# Patient Record
Sex: MALE | Race: WHITE | NOT HISPANIC OR LATINO
[De-identification: names, ages, dates, MRNs, and addresses within clinical notes are randomized per-mention and may not be internally consistent; named-entity substitution may affect disease eponyms.]

---

## 2018-11-05 ENCOUNTER — RECORD ABSTRACTING (OUTPATIENT)
Age: 58
End: 2018-11-05

## 2018-11-05 DIAGNOSIS — Z83.1 FAMILY HISTORY OF OTHER INFECTIOUS AND PARASITIC DISEASES: ICD-10-CM

## 2018-11-05 DIAGNOSIS — Z80.0 FAMILY HISTORY OF MALIGNANT NEOPLASM OF DIGESTIVE ORGANS: ICD-10-CM

## 2018-11-05 DIAGNOSIS — Z86.39 PERSONAL HISTORY OF OTHER ENDOCRINE, NUTRITIONAL AND METABOLIC DISEASE: ICD-10-CM

## 2018-11-05 DIAGNOSIS — Z80.1 FAMILY HISTORY OF MALIGNANT NEOPLASM OF TRACHEA, BRONCHUS AND LUNG: ICD-10-CM

## 2018-11-05 PROBLEM — Z00.00 ENCOUNTER FOR PREVENTIVE HEALTH EXAMINATION: Status: ACTIVE | Noted: 2018-11-05

## 2018-11-05 RX ORDER — PETROLATUM,WHITE/LANOLIN
OINTMENT (GRAM) TOPICAL
Refills: 0 | Status: ACTIVE | COMMUNITY

## 2018-11-05 RX ORDER — BLOOD-GLUCOSE METER
W/DEVICE EACH MISCELLANEOUS
Refills: 0 | Status: ACTIVE | COMMUNITY

## 2018-11-05 RX ORDER — LANCETS 33 GAUGE
EACH MISCELLANEOUS
Refills: 0 | Status: ACTIVE | COMMUNITY

## 2018-11-05 RX ORDER — BLOOD-GLUCOSE METER
W/DEVICE KIT MISCELLANEOUS
Refills: 0 | Status: ACTIVE | COMMUNITY

## 2018-11-05 RX ORDER — BLOOD SUGAR DIAGNOSTIC
STRIP MISCELLANEOUS
Refills: 0 | Status: ACTIVE | COMMUNITY

## 2018-11-30 ENCOUNTER — APPOINTMENT (OUTPATIENT)
Dept: ENDOCRINOLOGY | Facility: CLINIC | Age: 58
End: 2018-11-30

## 2019-02-07 ENCOUNTER — MEDICATION RENEWAL (OUTPATIENT)
Age: 59
End: 2019-02-07

## 2019-04-24 ENCOUNTER — APPOINTMENT (OUTPATIENT)
Dept: INTERNAL MEDICINE | Facility: CLINIC | Age: 59
End: 2019-04-24

## 2019-04-30 ENCOUNTER — APPOINTMENT (OUTPATIENT)
Dept: ENDOCRINOLOGY | Facility: CLINIC | Age: 59
End: 2019-04-30
Payer: COMMERCIAL

## 2019-04-30 VITALS
SYSTOLIC BLOOD PRESSURE: 120 MMHG | WEIGHT: 180 LBS | HEIGHT: 68 IN | DIASTOLIC BLOOD PRESSURE: 80 MMHG | HEART RATE: 71 BPM | BODY MASS INDEX: 27.28 KG/M2

## 2019-04-30 PROCEDURE — 99213 OFFICE O/P EST LOW 20 MIN: CPT

## 2019-05-03 LAB
ESTIMATED AVERAGE GLUCOSE: 243 MG/DL
HBA1C MFR BLD HPLC: 10.1 %

## 2019-05-03 NOTE — ASSESSMENT
[FreeTextEntry1] : &\par Need to review fingerstick BS, meter.\par Would be helpful to use CGM:  Freestyle Brian

## 2019-05-03 NOTE — PHYSICAL EXAM
[Alert] : alert [No Acute Distress] : no acute distress [Well Nourished] : well nourished [Well Developed] : well developed [Normal Sclera/Conjunctiva] : normal sclera/conjunctiva [EOMI] : extra ocular movement intact [No Proptosis] : no proptosis [Normal Oropharynx] : the oropharynx was normal [Thyroid Not Enlarged] : the thyroid was not enlarged [No Thyroid Nodules] : there were no palpable thyroid nodules [No Respiratory Distress] : no respiratory distress [Clear to Auscultation] : lungs were clear to auscultation bilaterally [No Accessory Muscle Use] : no accessory muscle use [Normal Rate] : heart rate was normal  [Normal S1, S2] : normal S1 and S2 [Regular Rhythm] : with a regular rhythm [Pedal Pulses Normal] : the pedal pulses are present [No Edema] : there was no peripheral edema [Normal Bowel Sounds] : normal bowel sounds [Not Tender] : non-tender [Soft] : abdomen soft [Post Cervical Nodes] : posterior cervical nodes [Not Distended] : not distended [Anterior Cervical Nodes] : anterior cervical nodes [Normal] : normal and non tender [Axillary Nodes] : axillary nodes [Spine Straight] : spine straight [No Spinal Tenderness] : no spinal tenderness [No Stigmata of Cushings Syndrome] : no stigmata of cushings syndrome [Normal Gait] : normal gait [Normal Strength/Tone] : muscle strength and tone were normal [No Rash] : no rash [Normal Reflexes] : deep tendon reflexes were 2+ and symmetric [No Tremors] : no tremors [Oriented x3] : oriented to person, place, and time [Acanthosis Nigricans] : no acanthosis nigricans
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
## 2019-06-03 ENCOUNTER — RX RENEWAL (OUTPATIENT)
Age: 59
End: 2019-06-03

## 2019-06-16 ENCOUNTER — RX RENEWAL (OUTPATIENT)
Age: 59
End: 2019-06-16

## 2019-08-13 ENCOUNTER — APPOINTMENT (OUTPATIENT)
Dept: ENDOCRINOLOGY | Facility: CLINIC | Age: 59
End: 2019-08-13
Payer: COMMERCIAL

## 2019-08-13 VITALS
BODY MASS INDEX: 26.52 KG/M2 | HEIGHT: 68 IN | HEART RATE: 76 BPM | SYSTOLIC BLOOD PRESSURE: 120 MMHG | WEIGHT: 175 LBS | DIASTOLIC BLOOD PRESSURE: 80 MMHG

## 2019-08-13 PROCEDURE — 99214 OFFICE O/P EST MOD 30 MIN: CPT
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
## 2019-08-14 RX ORDER — ENALAPRIL MALEATE 10 MG/1
10 TABLET ORAL TWICE DAILY
Refills: 0 | Status: DISCONTINUED | COMMUNITY
End: 2019-08-14

## 2019-09-19 ENCOUNTER — RX RENEWAL (OUTPATIENT)
Age: 59
End: 2019-09-19

## 2019-09-22 ENCOUNTER — RX RENEWAL (OUTPATIENT)
Age: 59
End: 2019-09-22

## 2019-09-22 RX ORDER — FLASH GLUCOSE SCANNING READER
EACH MISCELLANEOUS
Qty: 1 | Refills: 0 | Status: ACTIVE | COMMUNITY
Start: 2019-09-22 | End: 1900-01-01

## 2020-01-03 ENCOUNTER — RX RENEWAL (OUTPATIENT)
Age: 60
End: 2020-01-03

## 2020-01-06 ENCOUNTER — RX RENEWAL (OUTPATIENT)
Age: 60
End: 2020-01-06

## 2020-01-10 ENCOUNTER — APPOINTMENT (OUTPATIENT)
Dept: ENDOCRINOLOGY | Facility: CLINIC | Age: 60
End: 2020-01-10
Payer: COMMERCIAL

## 2020-01-10 VITALS
WEIGHT: 174 LBS | DIASTOLIC BLOOD PRESSURE: 78 MMHG | HEART RATE: 87 BPM | BODY MASS INDEX: 26.37 KG/M2 | HEIGHT: 68 IN | SYSTOLIC BLOOD PRESSURE: 122 MMHG

## 2020-01-10 PROCEDURE — 99214 OFFICE O/P EST MOD 30 MIN: CPT | Mod: 25

## 2020-01-10 PROCEDURE — 36415 COLL VENOUS BLD VENIPUNCTURE: CPT

## 2020-01-10 NOTE — PHYSICAL EXAM
[Alert] : alert [Well Developed] : well developed [Well Nourished] : well nourished [No Acute Distress] : no acute distress [Normal Voice/Communication] : normal voice communication [Healthy Appearance] : healthy appearance [Normal Oropharynx] : the oropharynx was normal [EOMI] : extra ocular movement intact [No Proptosis] : no proptosis [No Respiratory Distress] : no respiratory distress [No Thyroid Nodules] : there were no palpable thyroid nodules [Thyroid Not Enlarged] : the thyroid was not enlarged [Normal Rate] : heart rate was normal  [No Accessory Muscle Use] : no accessory muscle use [Normal S1, S2] : normal S1 and S2 [No Edema] : there was no peripheral edema [Regular Rhythm] : with a regular rhythm [Pedal Pulses Normal] : the pedal pulses are present [Spine Straight] : spine straight [No Stigmata of Cushings Syndrome] : no stigmata of cushings syndrome [Normal Gait] : normal gait [No Involuntary Movements] : no involuntary movements were seen [Acanthosis Nigricans] : no acanthosis nigricans [Oriented x3] : oriented to person, place, and time [No Tremors] : no tremors [Normal Insight/Judgement] : insight and judgment were intact [Normal Affect] : the affect was normal [Normal Mood] : the mood was normal
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
## 2020-01-11 LAB
ANION GAP SERPL CALC-SCNC: 13 MMOL/L
BUN SERPL-MCNC: 19 MG/DL
CALCIUM SERPL-MCNC: 9.5 MG/DL
CHLORIDE SERPL-SCNC: 100 MMOL/L
CO2 SERPL-SCNC: 25 MMOL/L
CREAT SERPL-MCNC: 1.01 MG/DL
ESTIMATED AVERAGE GLUCOSE: 214 MG/DL
GLUCOSE SERPL-MCNC: 215 MG/DL
HBA1C MFR BLD HPLC: 9.1 %
POTASSIUM SERPL-SCNC: 4.7 MMOL/L
SODIUM SERPL-SCNC: 138 MMOL/L

## 2020-01-11 RX ORDER — ELECTROLYTES/DEXTROSE
31G X 5 MM SOLUTION, ORAL ORAL
Qty: 100 | Refills: 1 | Status: ACTIVE | COMMUNITY
Start: 2020-01-11 | End: 1900-01-01

## 2020-01-13 RX ORDER — INSULIN DETEMIR 100 [IU]/ML
100 INJECTION, SOLUTION SUBCUTANEOUS DAILY
Qty: 6 | Refills: 1 | Status: DISCONTINUED | COMMUNITY
Start: 2020-01-11 | End: 2020-01-13

## 2020-04-01 ENCOUNTER — APPOINTMENT (OUTPATIENT)
Dept: ENDOCRINOLOGY | Facility: CLINIC | Age: 60
End: 2020-04-01
Payer: COMMERCIAL

## 2020-04-01 PROCEDURE — 99443: CPT
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
## 2020-05-22 ENCOUNTER — RX RENEWAL (OUTPATIENT)
Age: 60
End: 2020-05-22

## 2020-09-30 ENCOUNTER — APPOINTMENT (OUTPATIENT)
Dept: INTERNAL MEDICINE | Facility: CLINIC | Age: 60
End: 2020-09-30
Payer: COMMERCIAL

## 2020-09-30 ENCOUNTER — NON-APPOINTMENT (OUTPATIENT)
Age: 60
End: 2020-09-30

## 2020-09-30 VITALS
BODY MASS INDEX: 25.91 KG/M2 | OXYGEN SATURATION: 100 % | TEMPERATURE: 98.4 F | HEIGHT: 68 IN | WEIGHT: 171 LBS | HEART RATE: 92 BPM | DIASTOLIC BLOOD PRESSURE: 80 MMHG | SYSTOLIC BLOOD PRESSURE: 124 MMHG

## 2020-09-30 DIAGNOSIS — Z23 ENCOUNTER FOR IMMUNIZATION: ICD-10-CM

## 2020-09-30 DIAGNOSIS — Z12.11 ENCOUNTER FOR SCREENING FOR MALIGNANT NEOPLASM OF COLON: ICD-10-CM

## 2020-09-30 PROCEDURE — 90686 IIV4 VACC NO PRSV 0.5 ML IM: CPT

## 2020-09-30 PROCEDURE — 36415 COLL VENOUS BLD VENIPUNCTURE: CPT

## 2020-09-30 PROCEDURE — 99396 PREV VISIT EST AGE 40-64: CPT | Mod: 25

## 2020-09-30 PROCEDURE — G0442 ANNUAL ALCOHOL SCREEN 15 MIN: CPT

## 2020-09-30 PROCEDURE — G0444 DEPRESSION SCREEN ANNUAL: CPT

## 2020-09-30 PROCEDURE — 93000 ELECTROCARDIOGRAM COMPLETE: CPT | Mod: 59

## 2020-09-30 PROCEDURE — G0008: CPT

## 2020-10-02 ENCOUNTER — NON-APPOINTMENT (OUTPATIENT)
Age: 60
End: 2020-10-02

## 2020-10-02 PROBLEM — Z12.11 SCREENING FOR COLON CANCER: Status: ACTIVE | Noted: 2020-09-30

## 2020-10-02 PROBLEM — Z23 FLU VACCINE NEED: Status: ACTIVE | Noted: 2020-09-30

## 2020-10-02 LAB
PSA FREE FLD-MCNC: 20 %
PSA FREE SERPL-MCNC: 0.75 NG/ML
PSA SERPL-MCNC: 3.7 NG/ML

## 2020-10-02 NOTE — PHYSICAL EXAM
[No Acute Distress] : no acute distress [Normal Sclera/Conjunctiva] : normal sclera/conjunctiva [PERRL] : pupils equal round and reactive to light [EOMI] : extraocular movements intact [Normal Outer Ear/Nose] : the outer ears and nose were normal in appearance [Normal TMs] : both tympanic membranes were normal [No JVD] : no jugular venous distention [No Respiratory Distress] : no respiratory distress  [Clear to Auscultation] : lungs were clear to auscultation bilaterally [Normal Rate] : normal rate  [Regular Rhythm] : with a regular rhythm [Normal S1, S2] : normal S1 and S2 [No Murmur] : no murmur heard [No Carotid Bruits] : no carotid bruits [Pedal Pulses Present] : the pedal pulses are present [No Edema] : there was no peripheral edema [Soft] : abdomen soft [Non Tender] : non-tender [Normal Bowel Sounds] : normal bowel sounds [Normal Sphincter Tone] : normal sphincter tone [No Mass] : no mass [Normal Supraclavicular Nodes] : no supraclavicular lymphadenopathy [Normal Axillary Nodes] : no axillary lymphadenopathy [Normal Posterior Cervical Nodes] : no posterior cervical lymphadenopathy [Normal Anterior Cervical Nodes] : no anterior cervical lymphadenopathy [No CVA Tenderness] : no CVA  tenderness [No Spinal Tenderness] : no spinal tenderness [No Joint Swelling] : no joint swelling [Grossly Normal Strength/Tone] : grossly normal strength/tone [Normal] : affect was normal and insight and judgment were intact [None] : no ulcers in either foot were found [Stool Occult Blood] : stool negative for occult blood [FreeTextEntry1] : Normal prostate [de-identified] : No maceration between toes [de-identified] : Normal sensation to fine touch\par Normal vibratory sense [TWNoteComboBox3] : +2 [TWNoteComboBox4] : +2

## 2020-10-02 NOTE — REVIEW OF SYSTEMS
[Fever] : no fever [Chills] : no chills [Pain] : no pain [Redness] : no redness [Earache] : no earache [Hearing Loss] : no hearing loss [Sore Throat] : no sore throat [Chest Pain] : no chest pain [Palpitations] : no palpitations [Shortness Of Breath] : no shortness of breath [Abdominal Pain] : no abdominal pain [Constipation] : no constipation [Melena] : no melena [Dysuria] : no dysuria [Incontinence] : no incontinence [Nocturia] : no nocturia [Frequency] : no frequency [Joint Pain] : no joint pain [Itching] : no itching [Skin Rash] : no skin rash [Headache] : no headache [Dizziness] : no dizziness [Suicidal] : not suicidal [Depression] : no depression [Easy Bleeding] : no easy bleeding [Swollen Glands] : no swollen glands [FreeTextEntry3] : last eye exam 2020 [FreeTextEntry1] : Endocrine: No increased thirst, polyuria, polydipsia

## 2020-10-02 NOTE — HEALTH RISK ASSESSMENT
[Good] : ~his/her~  mood as  good [0-5] : 0-5 [Yes] : Yes [1 or 2 (0 pts)] : 1 or 2 (0 points) [Never (0 pts)] : Never (0 points) [No] : In the past 12 months have you used drugs other than those required for medical reasons? No [No falls in past year] : Patient reported no falls in the past year [0] : 2) Feeling down, depressed, or hopeless: Not at all (0) [HIV test declined] : HIV test declined [Hepatitis C test declined] : Hepatitis C test declined [None] : None [With Family] : lives with family [Employed] : employed [] :  [Sexually Active] : sexually active [Feels Safe at Home] : Feels safe at home [Smoke Detector] : smoke detector [Carbon Monoxide Detector] : carbon monoxide detector [Seat Belt] :  uses seat belt [Monthly or less (1 pt)] : Monthly or less (1 point) [Patient declined colonoscopy] : Patient declined colonoscopy [With Patient/Caregiver] : With Patient/Caregiver [] : No [Audit-CScore] : 1 [de-identified] : l [[id number]] : 0 [Change in mental status noted] : No change in mental status noted [Language] : denies difficulty with language [Behavior] : denies difficulty with behavior [Learning/Retaining New Information] : denies difficulty learning/retaining new information [Handling Complex Tasks] : denies difficulty handling complex tasks [Reasoning] : denies difficulty with reasoning [Spatial Ability and Orientation] : denies difficulty with spatial ability and orientation [Reports changes in hearing] : Reports no changes in hearing [Reports changes in vision] : Reports no changes in vision [Reports normal functional visual acuity (ie: able to read med bottle)] : Reports poor functional visual acuity.  [Reports changes in dental health] : Reports no changes in dental health [Safety elements used in home] : no safety elements used in home [AdvancecareDate] : 09/20

## 2020-10-02 NOTE — HISTORY OF PRESENT ILLNESS
[FreeTextEntry1] : Annual visit [de-identified] : Patient is a 60 year-old man history of hypertension, hyperlipidemia, diabetes here for his annual physical.  He is followed closely by endocrinologist Dr. Hellerman.  His last A1c was 6.3.  He denies increased thirst, polyuria, polydipsia.  He is requesting the flu vaccine.  He declines colonoscopy; he states that he does not have a family history of colon cancer.  No bloody or black stool.

## 2020-10-05 ENCOUNTER — APPOINTMENT (OUTPATIENT)
Dept: ENDOCRINOLOGY | Facility: CLINIC | Age: 60
End: 2020-10-05
Payer: COMMERCIAL

## 2020-10-05 VITALS
SYSTOLIC BLOOD PRESSURE: 130 MMHG | HEIGHT: 68 IN | WEIGHT: 172 LBS | HEART RATE: 90 BPM | RESPIRATION RATE: 98 BRPM | BODY MASS INDEX: 26.07 KG/M2 | DIASTOLIC BLOOD PRESSURE: 78 MMHG

## 2020-10-05 PROCEDURE — 99214 OFFICE O/P EST MOD 30 MIN: CPT
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
## 2020-10-06 ENCOUNTER — LABORATORY RESULT (OUTPATIENT)
Age: 60
End: 2020-10-06

## 2020-10-17 ENCOUNTER — APPOINTMENT (OUTPATIENT)
Dept: INTERNAL MEDICINE | Facility: CLINIC | Age: 60
End: 2020-10-17
Payer: COMMERCIAL

## 2020-10-17 DIAGNOSIS — Z23 ENCOUNTER FOR IMMUNIZATION: ICD-10-CM

## 2020-10-17 PROCEDURE — G0009: CPT

## 2020-10-17 PROCEDURE — 90732 PPSV23 VACC 2 YRS+ SUBQ/IM: CPT

## 2021-02-01 ENCOUNTER — APPOINTMENT (OUTPATIENT)
Dept: ENDOCRINOLOGY | Facility: CLINIC | Age: 61
End: 2021-02-01
Payer: COMMERCIAL

## 2021-02-01 PROCEDURE — 99214 OFFICE O/P EST MOD 30 MIN: CPT | Mod: 95

## 2021-02-01 RX ORDER — INSULIN GLARGINE 100 [IU]/ML
100 INJECTION, SOLUTION SUBCUTANEOUS DAILY
Qty: 2 | Refills: 3 | Status: DISCONTINUED | COMMUNITY
Start: 2020-01-13 | End: 2021-02-01

## 2021-02-01 RX ORDER — LINAGLIPTIN 5 MG/1
5 TABLET, FILM COATED ORAL DAILY
Refills: 0 | Status: DISCONTINUED | COMMUNITY
End: 2021-02-01

## 2021-02-01 NOTE — ASSESSMENT
[FreeTextEntry1] : Doing well.\par Same Rx, but cut down on glipizide ER in PM\par f/u ophthalmology\par will go for A1c levels now and before 4 month updated.

## 2021-02-01 NOTE — HISTORY OF PRESENT ILLNESS
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            .\par            =======\par            Previous note below. Brought meter. No PC sugars. Will start Januvia 100 mg daily. Get labs from PCP. ROV 3 months. He saw Dr. HOWE for eye check and was given good report. Meter shows 14 d N = 14, avg 157 mg/dl.\par            .\par            .\par            Patient first told of "pre-diabetes" in 2001. He was started on medication for his sugar in 2005. His current diabetes medications include:\par            .\par            glipizde ER 10 mg every PM and\par            metformin  mg BID \par            At last visit, also given a trial of Onglyza 2.5 mg daily (but he did not take)\par            .\par            In April HbA1c was 157 mg/dl at [facility]. \par            .\par            Eyes are being checked yearly by Dr. Nicole and he saw her recently and was given a good report. In October, had physical at PCP.\par            .\par            Has been under stress. \par            Brought in his Contour Meter (original) that shows 14d avg 194 mg/dl\par            .\par

## 2021-06-16 ENCOUNTER — APPOINTMENT (OUTPATIENT)
Dept: ENDOCRINOLOGY | Facility: CLINIC | Age: 61
End: 2021-06-16
Payer: COMMERCIAL

## 2021-06-16 PROCEDURE — 99213 OFFICE O/P EST LOW 20 MIN: CPT | Mod: 95

## 2021-06-16 NOTE — HISTORY OF PRESENT ILLNESS
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            .\par            November - Dr. Nicole -checked for retinopathy - good report.\par            Impression: Time for updated A1c and fructosamine. \par            Plan: He prefers to have blood tests when he sees Dr. Guy in near future. I will ask him to have:\par            A1c\par            fructosamin\par            C-peptide\par            BMP\par            LFTs\par            spot urine for microalbumin and routine U/A\par            LH, FSH\par            prolactin, testosterone\par            .\par            ROV within 3 months.\par            .\par            =====\par            Oct 1, 2013\par            PCP: Dr. Guy Eyes: Dr. Nicole\par            CC: DM2 (pre-DM: 2001; DM2 Dx 2005)\par            ;\par            Now on Januvia 100 mg - stopped. \par            glipizide ER 10 mg in PM\par            metformin  mg BID\par            nateglanide prn\par            .\par            Says FBS are excellent, down to 72 - NR today.\par            Says PC sugars under 180 mg/dl.\par            .\par            Likes large salad his wife makes.\par            .\par            Needs colonoscopy.\par            .\par            .\par            .\par            =======\par            Previous note below. Brought meter. No PC sugars. Will start Januvia 100 mg daily. Get labs from PCP. ROV 3 months. He saw Dr. HOWE for eye check and was given good report. Meter shows 14 d N = 14, avg 157 mg/dl.\par            .\par            .\par            Patient first told of "pre-diabetes" in 2001. He was started on medication for his sugar in 2005. His current diabetes medications include:\par            .\par            glipizde ER 10 mg every PM and\par            metformin  mg BID \par            At last visit, also given a trial of Onglyza 2.5 mg daily (but he did not take)\par            .\par            In April HbA1c was 157 mg/dl at [place]. \par            .\par            Eyes are being checked yearly by Dr. Nicole and he saw her recently and was given a good report. In October, had physical at PCP.\par            .\par            Has been under stress. \par            Brought in his Contour Meter (original) that shows 14d avg 194 mg/dl\par            .\par

## 2021-12-28 ENCOUNTER — NON-APPOINTMENT (OUTPATIENT)
Age: 61
End: 2021-12-28

## 2022-01-25 ENCOUNTER — RX RENEWAL (OUTPATIENT)
Age: 62
End: 2022-01-25

## 2022-04-14 ENCOUNTER — APPOINTMENT (OUTPATIENT)
Dept: ENDOCRINOLOGY | Facility: CLINIC | Age: 62
End: 2022-04-14
Payer: COMMERCIAL

## 2022-04-14 ENCOUNTER — TRANSCRIPTION ENCOUNTER (OUTPATIENT)
Age: 62
End: 2022-04-14

## 2022-04-14 VITALS
DIASTOLIC BLOOD PRESSURE: 76 MMHG | HEIGHT: 68 IN | BODY MASS INDEX: 25.91 KG/M2 | WEIGHT: 171 LBS | HEART RATE: 96 BPM | SYSTOLIC BLOOD PRESSURE: 125 MMHG | OXYGEN SATURATION: 98 %

## 2022-04-14 PROCEDURE — 99214 OFFICE O/P EST MOD 30 MIN: CPT | Mod: 25

## 2022-04-14 PROCEDURE — 36415 COLL VENOUS BLD VENIPUNCTURE: CPT

## 2022-04-14 RX ORDER — FLASH GLUCOSE SENSOR
KIT MISCELLANEOUS
Qty: 1 | Refills: 1 | Status: ACTIVE | COMMUNITY
Start: 2022-04-14 | End: 1900-01-01

## 2022-04-14 NOTE — HISTORY OF PRESENT ILLNESS
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 even 5 mg after a heavy rowing)\par            metofrmin  BID\par            rare generic Starlix prn a large meal (60 mg)\par            .\par            Had blood tests done by Dr. Guy in April.\par            BS at 8:00 am was 226 mg/dl ?fasting\par            C-peptide 7.8 (0.9-4.0) (indicating some insulin resistance)\par            Fructosamine 355 (205-285) \par            HbA1c 7.8%\par            LH 7.9 \par            prolactin 7.7\par            Testosterone 207 (241-827) \par            .\par            He is testing with original Contour meter. \par            14 d average 105 n = 7 \par            Highes  mg/dl.\par            .\par            Impression: By his fingerstick BS, seems to be doing well.\par            A1c suggests there may be room for further improvement.\par            .\par            Plan: Continue metformin  mg BID\par            glipizide ER 5 mg in PM\par            prn generic Starlix 60 mg large meal.\par            Do at least one meal before and after sugars a week.\par            .\par            ROV October after visit to Dr. Guy and updated urine microalbumin,\par            complete U/A with microscopic, HbA1c.\par            I will try to get him a One Touch meter. \par            .\par            =====\par            April 1, 2014\par            PCP: Dr. Guy\par            CC: DM2\par            .\par            Has managed to lose some weight (5 ft 8 in , 174 lbs)\par            glipizide ER 10 mg in PM\par            metformin  mg BID\par            nateglanide prn (virtually never)\par            .\par            November - Dr. Nicole -checked for retinopathy - good report.\par            Impression: Time for updated A1c and fructosamine. \par            Plan: He prefers to have blood tests when he sees Dr. Guy in near future. I will ask him to have:\par            A1c\par            fructosamin\par            C-peptide\par            BMP\par            LFTs\par            spot urine for microalbumin and routine U/A\par            LH, FSH\par            prolactin, testosterone\par            .\par            ROV within 3 months.\par            .\par            =====\par            Oct 1, 2013\par            PCP: Dr. Guy Eyes: Dr. Nicole\par            CC: DM2 (pre-DM: 2001; DM2 Dx 2005)\par            ;\par            Now on Januvia 100 mg - stopped. \par            glipizide ER 10 mg in PM\par            metformin  mg BID\par            nateglanide prn\par            .\par            Says FBS are excellent, down to 72 - NR today.\par            Says PC sugars under 180 mg/dl.\par            .\par            Likes large salad his wife makes.\par            .\par            Needs colonoscopy.\par            .\par            .\par            .\par            =======\par            Previous note below. Brought meter. No PC sugars. Will start Januvia 100 mg daily. Get labs from PCP. ROV 3 months. He saw Dr. HOWE for eye check and was given good report. Meter shows 14 d N = 14, avg 157 mg/dl.\par            .\par            .\par            Patient first told of "pre-diabetes" in 2001. He was started on medication for his sugar in 2005. His current diabetes medications include:\par            .\par            glipizde ER 10 mg every PM and\par            metformin  mg BID \par            At last visit, also given a trial of Onglyza 2.5 mg daily (but he did not take)\par            .\par            In April HbA1c was 157 mg/dl at [place]. \par            .\par            Eyes are being checked yearly by Dr. Nicole and he saw her recently and was given a good report. In October, had physical at PCP.\par            .\par            Has been under stress. \par            Brought in his Contour Meter (original) that shows 14d avg 194 mg/dl\par            .\par

## 2022-06-07 ENCOUNTER — RX RENEWAL (OUTPATIENT)
Age: 62
End: 2022-06-07

## 2022-06-15 LAB
ALBUMIN SERPL ELPH-MCNC: 4.8 G/DL
ALP BLD-CCNC: 64 U/L
ALT SERPL-CCNC: 15 U/L
ANION GAP SERPL CALC-SCNC: 13 MMOL/L
APPEARANCE: CLEAR
AST SERPL-CCNC: 13 U/L
BASOPHILS # BLD AUTO: 0.08 K/UL
BASOPHILS NFR BLD AUTO: 1 %
BILIRUB DIRECT SERPL-MCNC: 0.1 MG/DL
BILIRUB INDIRECT SERPL-MCNC: 0.1 MG/DL
BILIRUB SERPL-MCNC: 0.2 MG/DL
BILIRUBIN URINE: NEGATIVE
BLOOD URINE: NEGATIVE
BUN SERPL-MCNC: 20 MG/DL
CALCIUM SERPL-MCNC: 9.7 MG/DL
CHLORIDE SERPL-SCNC: 102 MMOL/L
CHOLEST SERPL-MCNC: 156 MG/DL
CO2 SERPL-SCNC: 23 MMOL/L
COLOR: NORMAL
CREAT SERPL-MCNC: 1.12 MG/DL
CREAT SPEC-SCNC: 142 MG/DL
EGFR: 75 ML/MIN/1.73M2
EOSINOPHIL # BLD AUTO: 0.2 K/UL
EOSINOPHIL NFR BLD AUTO: 2.4 %
ESTIMATED AVERAGE GLUCOSE: 214 MG/DL
GLUCOSE QUALITATIVE U: ABNORMAL
GLUCOSE SERPL-MCNC: 194 MG/DL
HBA1C MFR BLD HPLC: 9.1 %
HCT VFR BLD CALC: 45.6 %
HDLC SERPL-MCNC: 43 MG/DL
HGB BLD-MCNC: 14.6 G/DL
IMM GRANULOCYTES NFR BLD AUTO: 0.8 %
KETONES URINE: NEGATIVE
LDLC SERPL CALC-MCNC: 72 MG/DL
LEUKOCYTE ESTERASE URINE: NEGATIVE
LYMPHOCYTES # BLD AUTO: 1.79 K/UL
LYMPHOCYTES NFR BLD AUTO: 21.5 %
MAN DIFF?: NORMAL
MCHC RBC-ENTMCNC: 28.7 PG
MCHC RBC-ENTMCNC: 32 GM/DL
MCV RBC AUTO: 89.8 FL
MICROALBUMIN 24H UR DL<=1MG/L-MCNC: 4.3 MG/DL
MICROALBUMIN/CREAT 24H UR-RTO: 31 MG/G
MONOCYTES # BLD AUTO: 0.68 K/UL
MONOCYTES NFR BLD AUTO: 8.2 %
NEUTROPHILS # BLD AUTO: 5.51 K/UL
NEUTROPHILS NFR BLD AUTO: 66.1 %
NITRITE URINE: NEGATIVE
NONHDLC SERPL-MCNC: 113 MG/DL
PH URINE: 6
PLATELET # BLD AUTO: 205 K/UL
POTASSIUM SERPL-SCNC: 4.7 MMOL/L
PROT SERPL-MCNC: 7 G/DL
PROTEIN URINE: NORMAL
RBC # BLD: 5.08 M/UL
RBC # FLD: 13 %
SODIUM SERPL-SCNC: 138 MMOL/L
SPECIFIC GRAVITY URINE: 1.02
TRIGL SERPL-MCNC: 206 MG/DL
UROBILINOGEN URINE: NORMAL
WBC # FLD AUTO: 8.33 K/UL

## 2022-06-27 ENCOUNTER — APPOINTMENT (OUTPATIENT)
Dept: INTERNAL MEDICINE | Facility: CLINIC | Age: 62
End: 2022-06-27
Payer: COMMERCIAL

## 2022-06-27 ENCOUNTER — NON-APPOINTMENT (OUTPATIENT)
Age: 62
End: 2022-06-27

## 2022-06-27 VITALS
RESPIRATION RATE: 14 BRPM | BODY MASS INDEX: 26.67 KG/M2 | WEIGHT: 176 LBS | TEMPERATURE: 97.2 F | SYSTOLIC BLOOD PRESSURE: 120 MMHG | HEART RATE: 86 BPM | OXYGEN SATURATION: 98 % | DIASTOLIC BLOOD PRESSURE: 76 MMHG | HEIGHT: 68 IN

## 2022-06-27 DIAGNOSIS — E78.5 HYPERLIPIDEMIA, UNSPECIFIED: ICD-10-CM

## 2022-06-27 DIAGNOSIS — Z12.5 ENCOUNTER FOR SCREENING FOR MALIGNANT NEOPLASM OF PROSTATE: ICD-10-CM

## 2022-06-27 DIAGNOSIS — Z23 ENCOUNTER FOR IMMUNIZATION: ICD-10-CM

## 2022-06-27 DIAGNOSIS — I10 ESSENTIAL (PRIMARY) HYPERTENSION: ICD-10-CM

## 2022-06-27 DIAGNOSIS — Z00.00 ENCOUNTER FOR GENERAL ADULT MEDICAL EXAMINATION W/OUT ABNORMAL FINDINGS: ICD-10-CM

## 2022-06-27 PROCEDURE — 90471 IMMUNIZATION ADMIN: CPT

## 2022-06-27 PROCEDURE — 90715 TDAP VACCINE 7 YRS/> IM: CPT

## 2022-06-27 PROCEDURE — 99396 PREV VISIT EST AGE 40-64: CPT | Mod: 25

## 2022-06-27 PROCEDURE — 93000 ELECTROCARDIOGRAM COMPLETE: CPT

## 2022-06-27 PROCEDURE — 36415 COLL VENOUS BLD VENIPUNCTURE: CPT

## 2022-06-28 PROBLEM — I10 ESSENTIAL HYPERTENSION: Status: ACTIVE | Noted: 2019-02-07

## 2022-06-28 NOTE — REVIEW OF SYSTEMS
[Frequency] : frequency [Fever] : no fever [Chills] : no chills [Pain] : no pain [Redness] : no redness [Earache] : no earache [Hearing Loss] : no hearing loss [Sore Throat] : no sore throat [Chest Pain] : no chest pain [Palpitations] : no palpitations [Lower Ext Edema] : no lower extremity edema [Shortness Of Breath] : no shortness of breath [Abdominal Pain] : no abdominal pain [Constipation] : no constipation [Melena] : no melena [Dysuria] : no dysuria [Incontinence] : no incontinence [Nocturia] : no nocturia [Joint Pain] : no joint pain [Itching] : no itching [Skin Rash] : no skin rash [Headache] : no headache [Dizziness] : no dizziness [Suicidal] : not suicidal [Depression] : no depression [Easy Bleeding] : no easy bleeding [Swollen Glands] : no swollen glands [FreeTextEntry3] : last eye exam 2022 [FreeTextEntry1] : Endocrine: No increased thirst, positive polyuria, he drinks water on his own volition

## 2022-06-28 NOTE — PHYSICAL EXAM
[No Acute Distress] : no acute distress [Normal Sclera/Conjunctiva] : normal sclera/conjunctiva [PERRL] : pupils equal round and reactive to light [EOMI] : extraocular movements intact [Normal Outer Ear/Nose] : the outer ears and nose were normal in appearance [Normal TMs] : both tympanic membranes were normal [No JVD] : no jugular venous distention [No Respiratory Distress] : no respiratory distress  [Clear to Auscultation] : lungs were clear to auscultation bilaterally [Normal Rate] : normal rate  [Regular Rhythm] : with a regular rhythm [Normal S1, S2] : normal S1 and S2 [No Murmur] : no murmur heard [No Carotid Bruits] : no carotid bruits [Pedal Pulses Present] : the pedal pulses are present [No Edema] : there was no peripheral edema [Soft] : abdomen soft [Non Tender] : non-tender [Normal Bowel Sounds] : normal bowel sounds [Normal Sphincter Tone] : normal sphincter tone [No Mass] : no mass [Normal Supraclavicular Nodes] : no supraclavicular lymphadenopathy [Normal Axillary Nodes] : no axillary lymphadenopathy [Normal Posterior Cervical Nodes] : no posterior cervical lymphadenopathy [Normal Anterior Cervical Nodes] : no anterior cervical lymphadenopathy [No CVA Tenderness] : no CVA  tenderness [No Spinal Tenderness] : no spinal tenderness [No Joint Swelling] : no joint swelling [Grossly Normal Strength/Tone] : grossly normal strength/tone [Normal] : affect was normal and insight and judgment were intact [None] : no ulcers in either foot were found [Stool Occult Blood] : stool negative for occult blood [FreeTextEntry1] : normal Prostate [de-identified] : No maceration between toes [de-identified] : Normal sensation to fine touch\par Normal vibratory sense [TWNoteComboBox3] : +2 [TWNoteComboBox4] : +2

## 2022-06-28 NOTE — HEALTH RISK ASSESSMENT
[Never] : Never [5-9] : 5-9 [Patient reported colonoscopy was normal] : Patient reported colonoscopy was normal [HIV test declined] : HIV test declined [Hepatitis C test declined] : Hepatitis C test declined [YearQuit] : 1987 [ColonoscopyDate] : 10/20

## 2022-06-28 NOTE — CURRENT MEDS
[FreeTextEntry1] : flaxseed with omega 2 daily\par Balance of Nature \par Centrum daily\par Cinnamon 1000 mg 2 per day\par Vitamin D 3 2000 U daily\par B12 1000 mcg\par

## 2022-06-28 NOTE — HISTORY OF PRESENT ILLNESS
[FreeTextEntry1] : Annual physical [de-identified] : Patient is a 61-year-old man history of hyperlipidemia, diabetes, hypertension who comes in for his annual visit\par Endocrinologist: Dr. Hellerman.\par A1c 9.1 April 2022; he denies increased thirst, polyuria, polydipsia

## 2022-07-08 LAB
ALBUMIN SERPL ELPH-MCNC: 4.9 G/DL
ALP BLD-CCNC: 58 U/L
ALT SERPL-CCNC: 21 U/L
ANION GAP SERPL CALC-SCNC: 16 MMOL/L
APPEARANCE: CLEAR
AST SERPL-CCNC: 19 U/L
BACTERIA: NEGATIVE
BASOPHILS # BLD AUTO: 0.07 K/UL
BASOPHILS NFR BLD AUTO: 1.1 %
BILIRUB SERPL-MCNC: 0.2 MG/DL
BILIRUBIN URINE: NEGATIVE
BLOOD URINE: NEGATIVE
BUN SERPL-MCNC: 20 MG/DL
CALCIUM SERPL-MCNC: 10 MG/DL
CHLORIDE SERPL-SCNC: 100 MMOL/L
CHOLEST SERPL-MCNC: 166 MG/DL
CO2 SERPL-SCNC: 21 MMOL/L
COLOR: NORMAL
CREAT SERPL-MCNC: 1.12 MG/DL
CREAT SPEC-SCNC: 77 MG/DL
EGFR: 75 ML/MIN/1.73M2
EOSINOPHIL # BLD AUTO: 0.14 K/UL
EOSINOPHIL NFR BLD AUTO: 2.2 %
ESTIMATED AVERAGE GLUCOSE: 209 MG/DL
GLUCOSE QUALITATIVE U: NEGATIVE
GLUCOSE SERPL-MCNC: 141 MG/DL
HBA1C MFR BLD HPLC: 8.9 %
HCT VFR BLD CALC: 43.5 %
HDLC SERPL-MCNC: 43 MG/DL
HGB BLD-MCNC: 14.9 G/DL
HYALINE CASTS: 0 /LPF
IMM GRANULOCYTES NFR BLD AUTO: 0.5 %
KETONES URINE: NEGATIVE
LDLC SERPL CALC-MCNC: 91 MG/DL
LEUKOCYTE ESTERASE URINE: NEGATIVE
LYMPHOCYTES # BLD AUTO: 1.42 K/UL
LYMPHOCYTES NFR BLD AUTO: 22.5 %
MAN DIFF?: NORMAL
MCHC RBC-ENTMCNC: 29.2 PG
MCHC RBC-ENTMCNC: 34.3 GM/DL
MCV RBC AUTO: 85.1 FL
MICROALBUMIN 24H UR DL<=1MG/L-MCNC: 2.9 MG/DL
MICROALBUMIN/CREAT 24H UR-RTO: 38 MG/G
MICROSCOPIC-UA: NORMAL
MONOCYTES # BLD AUTO: 0.54 K/UL
MONOCYTES NFR BLD AUTO: 8.5 %
NEUTROPHILS # BLD AUTO: 4.12 K/UL
NEUTROPHILS NFR BLD AUTO: 65.2 %
NITRITE URINE: NEGATIVE
NONHDLC SERPL-MCNC: 123 MG/DL
PH URINE: 5.5
PLATELET # BLD AUTO: 192 K/UL
POTASSIUM SERPL-SCNC: 4.6 MMOL/L
PROT SERPL-MCNC: 7.1 G/DL
PROTEIN URINE: NEGATIVE
PSA FREE FLD-MCNC: 23 %
PSA FREE SERPL-MCNC: 0.92 NG/ML
PSA SERPL-MCNC: 4.02 NG/ML
RBC # BLD: 5.11 M/UL
RBC # FLD: 13 %
RED BLOOD CELLS URINE: 3 /HPF
SODIUM SERPL-SCNC: 137 MMOL/L
SPECIFIC GRAVITY URINE: 1.02
SQUAMOUS EPITHELIAL CELLS: 0 /HPF
TRIGL SERPL-MCNC: 163 MG/DL
TSH SERPL-ACNC: 1.59 UIU/ML
UROBILINOGEN URINE: NORMAL
WBC # FLD AUTO: 6.32 K/UL
WHITE BLOOD CELLS URINE: 0 /HPF

## 2022-08-22 ENCOUNTER — APPOINTMENT (OUTPATIENT)
Dept: ENDOCRINOLOGY | Facility: CLINIC | Age: 62
End: 2022-08-22

## 2022-08-22 VITALS
DIASTOLIC BLOOD PRESSURE: 70 MMHG | HEIGHT: 68 IN | SYSTOLIC BLOOD PRESSURE: 110 MMHG | WEIGHT: 177 LBS | HEART RATE: 84 BPM | BODY MASS INDEX: 26.83 KG/M2 | TEMPERATURE: 98 F | OXYGEN SATURATION: 96 %

## 2022-08-22 PROCEDURE — 99214 OFFICE O/P EST MOD 30 MIN: CPT

## 2022-08-22 RX ORDER — INSULIN ASPART 100 [IU]/ML
100 INJECTION, SOLUTION INTRAVENOUS; SUBCUTANEOUS
Qty: 1 | Refills: 6 | Status: ACTIVE | COMMUNITY
Start: 2022-08-22 | End: 1900-01-01

## 2022-08-22 RX ORDER — ELECTROLYTES/DEXTROSE
31G X 5 MM SOLUTION, ORAL ORAL
Qty: 100 | Refills: 6 | Status: ACTIVE | COMMUNITY
Start: 2022-08-22 | End: 1900-01-01

## 2022-08-22 NOTE — HISTORY OF PRESENT ILLNESS
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 meter.\par            Started on Januvia 100 mg in AM\par            He feels the Januvia plus exercise (shoveling snow, but will swim and bike soon) has also helped. \par            .\par            .\par            14 d avgerage 95 n 12 \par            highest 235 \par            .\par            ==\par            Jan 13, 2015\par            PCP: Dr. Tera Guy\par             Eyes: Dr. Nicole\par            . GI: \par            CC: DM2 (pre DM 2001, DM2 2005)\par            .\par            Lab studies from Jan 5, 2015, kindly provided by Dr. Guy show\par            \par            HbA1c 8.5% \par            normal spot urine microalbumin.\par            .\par            Meds.\par            metformin 500 mg BID\par            Glipizide ER in PM\par            generic Starlix 60 mg AC dinner.\par            .\par            Impression: He attributes elevated BS to holidays.\par            Notes when constipated, BS higher. \par            .\par            Has two children in college.\par            Commutes to  every day.\par            His impression is that he has not been able to pay as much attention to diet and exercise and stressed. \par            Plan; Same Rx, more testing, add Januvia 100 mg daily.\par            [facility]. \par            ROV one month. \par            .\par            .\par            ====\par            July 1, 2014\par            PCP: Dr. Tera Guy\par            CC: DM2 (pre DM 2001) (DM 2005)\par            .\par            Weight now down to 168 lbs. \par            Gave up soda.\par            .\par            Glipizide ER now down to 5 mg in PM\par            (got low on even 5 mg after a heavy rowing)\par            metofrmin  BID\par            rare generic Starlix prn a large meal (60 mg)\par            .\par            Had blood tests done by Dr. Guy in April.\par            BS at 8:00 am was 226 mg/dl ?fasting\par            C-peptide 7.8 (0.9-4.0) (indicating some insulin resistance)\par            Fructosamine 355 (205-285) \par            HbA1c 7.8%\par            LH 7.9 \par            prolactin 7.7\par            Testosterone 207 (241-827) \par            .\par            He is testing with original Contour meter. \par            14 d average 105 n = 7 \par            Highes  mg/dl.\par            .\par            Impression: By his fingerstick BS, seems to be doing well.\par            A1c suggests there may be room for further improvement.\par            .\par            Plan: Continue metformin  mg BID\par            glipizide ER 5 mg in PM\par            prn generic Starlix 60 mg large meal.\par            Do at least one meal before and after sugars a week.\par            .\par            ROV October after visit to Dr. Guy and updated urine microalbumin,\par            complete U/A with microscopic, HbA1c.\par            I will try to get him a One Touch meter. \par            .\par            =====\par            April 1, 2014\par            PCP: Dr. Guy\par            CC: DM2\par            .\par            Has managed to lose some weight (5 ft 8 in , 174 lbs)\par            glipizide ER 10 mg in PM\par            metformin  mg BID\par            nateglanide prn (virtually never)\par            .\par            November - Dr. Nicole -checked for retinopathy - good report.\par            Impression: Time for updated A1c and fructosamine. \par            Plan: He prefers to have blood tests when he sees Dr. Guy in near future. I will ask him to have:\par            A1c\par            fructosamin\par            C-peptide\par            BMP\par            LFTs\par            spot urine for microalbumin and routine U/A\par            LH, FSH\par            prolactin, testosterone\par            .\par            ROV within 3 months.\par            .\par            =====\par            Oct 1, 2013\par            PCP: Dr. Guy Eyes: Dr. Nicole\par            CC: DM2 (pre-DM: 2001; DM2 Dx 2005)\par            ;\par            Now on Januvia 100 mg - stopped. \par            glipizide ER 10 mg in PM\par            metformin  mg BID\par            nateglanide prn\par            .\par            Says FBS are excellent, down to 72 - NR today.\par            Says PC sugars under 180 mg/dl.\par            .\par            Likes large salad his wife makes.\par            .\par            Needs colonoscopy.\par            .\par            .\par            .\par            =======\par            Previous note below. Brought meter. No PC sugars. Will start Januvia 100 mg daily. Get labs from PCP. ROV 3 months. He saw Dr. HOWE for eye check and was given good report. Meter shows 14 d N = 14, avg 157 mg/dl.\par            .\par            .\par            Patient first told of "pre-diabetes" in 2001. He was started on medication for his sugar in 2005. His current diabetes medications include:\par            .\par            glipizde ER 10 mg every PM and\par            metformin  mg BID \par            At last visit, also given a trial of Onglyza 2.5 mg daily (but he did not take)\par            .\par            In April HbA1c was 157 mg/dl at [facility]. \par            .\par            Eyes are being checked yearly by Dr. Nicole and he saw her recently and was given a good report. In October, had physical at PCP.\par            .\par            Has been under stress. \par            Brought in his Contour Meter (original) that shows 14d avg 194 mg/dl\par            .\par

## 2022-10-12 ENCOUNTER — APPOINTMENT (OUTPATIENT)
Dept: INTERNAL MEDICINE | Facility: CLINIC | Age: 62
End: 2022-10-12

## 2022-10-12 DIAGNOSIS — Z86.39 PERSONAL HISTORY OF OTHER ENDOCRINE, NUTRITIONAL AND METABOLIC DISEASE: ICD-10-CM

## 2022-10-12 DIAGNOSIS — U07.1 COVID-19: ICD-10-CM

## 2022-10-12 DIAGNOSIS — R05.9 COUGH, UNSPECIFIED: ICD-10-CM

## 2022-10-12 PROCEDURE — 99442: CPT | Mod: 95

## 2022-12-12 ENCOUNTER — APPOINTMENT (OUTPATIENT)
Dept: ENDOCRINOLOGY | Facility: CLINIC | Age: 62
End: 2022-12-12

## 2022-12-12 VITALS
SYSTOLIC BLOOD PRESSURE: 135 MMHG | BODY MASS INDEX: 26.07 KG/M2 | OXYGEN SATURATION: 99 % | HEIGHT: 68 IN | DIASTOLIC BLOOD PRESSURE: 77 MMHG | WEIGHT: 172 LBS | HEART RATE: 86 BPM

## 2022-12-12 PROCEDURE — 99214 OFFICE O/P EST MOD 30 MIN: CPT | Mod: 25

## 2022-12-12 PROCEDURE — 36415 COLL VENOUS BLD VENIPUNCTURE: CPT

## 2022-12-12 RX ORDER — NIRMATRELVIR AND RITONAVIR 300-100 MG
20 X 150 MG & KIT ORAL
Qty: 1 | Refills: 0 | Status: DISCONTINUED | COMMUNITY
Start: 2022-10-12 | End: 2022-12-12

## 2022-12-12 NOTE — HISTORY OF PRESENT ILLNESS
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 Tera Guy\par             Eyes: Dr. Nicole (mild retinopathy)\par            . 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 Guy include A1c 6.8% (down from 8.5 % in January). \par            .\par            Diabetes medications include:\par            .\par            metformin 500 mg BID\par            Januvia 100 mg daily \par            Glipizide ER 5 mg in PM\par            generic Starlix 60 mg AC dinner. (nateglinide)\par            .\par            Enalapril 10 mg\par            Simvastatin 20 mg\par            Cinnamon and chromium\par            .\par            Reports FBS today 55 mg/dl.\par            .\par            Impression: All things considered, he is doing very well.\par            .\par            Plan: ROV 4-5 months. \par            Annual eye exam.\par            .\par            ==\par            .\par            March 9, 2015\par            .\par            PCP: Dr. Tera Guy\par             Eyes: Dr. Nicole (mild retinopathy)\par            . \par            .\par            CC: DM2 (pre DM 2001, DM2 2005)\par            .\par            55 yo\par            Self tests with original Contour meter.\par            Started on Januvia 100 mg in AM\par            He feels the Januvia plus exercise (shoveling snow, but will swim and bike soon) has also helped. \par            .\par            .\par            14 d avgerage 95 n 12 \par            highest 235 \par            .\par            ==\par            Jan 13, 2015\par            PCP: Dr. Tera Guy\par             Eyes: Dr. Nicole\par            . GI: \par            CC: DM2 (pre DM 2001, DM2 2005)\par            .\par            Lab studies from Jan 5, 2015, kindly provided by Dr. Guy show\par            \par            HbA1c 8.5% \par            normal spot urine microalbumin.\par            .\par            Meds.\par            metformin 500 mg BID\par            Glipizide ER in PM\par            generic Starlix 60 mg AC dinner.\par            .\par            Impression: He attributes elevated BS to holidays.\par            Notes when constipated, BS higher. \par            .\par            Has two children in college.\par            Commutes to  every day.\par            His impression is that he has not been able to pay as much attention to diet and exercise and stressed. \par            Plan; Same Rx, more testing, add Januvia 100 mg daily.\par            [facility]. \par            ROV one month. \par            .\par            .\par            ====\par            July 1, 2014\par            PCP: Dr. Tera Guy\par            CC: DM2 (pre DM 2001) (DM 2005)\par            .\par            Weight now down to 168 lbs. \par            Gave up soda.\par            .\par            Glipizide ER now down to 5 mg in PM\par            (got low on even 5 mg after a heavy rowing)\par            metofrmin  BID\par            rare generic Starlix prn a large meal (60 mg)\par            .\par            Had blood tests done by Dr. Guy in April.\par            BS at 8:00 am was 226 mg/dl ?fasting\par            C-peptide 7.8 (0.9-4.0) (indicating some insulin resistance)\par            Fructosamine 355 (205-285) \par            HbA1c 7.8%\par            LH 7.9 \par            prolactin 7.7\par            Testosterone 207 (241-827) \par            .\par            He is testing with original Contour meter. \par            14 d average 105 n = 7 \par            Highes  mg/dl.\par            .\par            Impression: By his fingerstick BS, seems to be doing well.\par            A1c suggests there may be room for further improvement.\par            .\par            Plan: Continue metformin  mg BID\par            glipizide ER 5 mg in PM\par            prn generic Starlix 60 mg large meal.\par            Do at least one meal before and after sugars a week.\par            .\par            ROV October after visit to Dr. Guy and updated urine microalbumin,\par            complete U/A with microscopic, HbA1c.\par            I will try to get him a One Touch meter. \par            .\par            =====\par            April 1, 2014\par            PCP: Dr. Guy\par            CC: DM2\par            .\par            Has managed to lose some weight (5 ft 8 in , 174 lbs)\par            glipizide ER 10 mg in PM\par            metformin  mg BID\par            nateglanide prn (virtually never)\par            .\par            November - Dr. Nicole -checked for retinopathy - good report.\par            Impression: Time for updated A1c and fructosamine. \par            Plan: He prefers to have blood tests when he sees Dr. Guy in near future. I will ask him to have:\par            A1c\par            fructosamin\par            C-peptide\par            BMP\par            LFTs\par            spot urine for microalbumin and routine U/A\par            LH, FSH\par            prolactin, testosterone\par            .\par            ROV within 3 months.\par            .\par            =====\par            Oct 1, 2013\par            PCP: Dr. Guy Eyes: Dr. Nicole\par            CC: DM2 (pre-DM: 2001; DM2 Dx 2005)\par            ;\par            Now on Januvia 100 mg - stopped. \par            glipizide ER 10 mg in PM\par            metformin  mg BID\par            nateglanide prn\par            .\par            Says FBS are excellent, down to 72 - NR today.\par            Says PC sugars under 180 mg/dl.\par            .\par            Likes large salad his wife makes.\par            .\par            Needs colonoscopy.\par            .\par            .\par            .\par            =======\par            Previous note below. Brought meter. No PC sugars. Will start Januvia 100 mg daily. Get labs from PCP. ROV 3 months. He saw Dr. HOWE for eye check and was given good report. Meter shows 14 d N = 14, avg 157 mg/dl.\par            .\par            .\par            Patient first told of "pre-diabetes" in 2001. He was started on medication for his sugar in 2005. His current diabetes medications include:\par            .\par            glipizde ER 10 mg every PM and\par            metformin  mg BID \par            At last visit, also given a trial of Onglyza 2.5 mg daily (but he did not take)\par            .\par            In April HbA1c was 157 mg/dl at [place]. \par            .\par            Eyes are being checked yearly by Dr. Nicole and he saw her recently and was given a good report. In October, had physical at PCP.\par            .\par            Has been under stress. \par            Brought in his Contour Meter (original) that shows 14d avg 194 mg/dl\par            .\par

## 2022-12-14 LAB
ANION GAP SERPL CALC-SCNC: 13 MMOL/L
BUN SERPL-MCNC: 26 MG/DL
CALCIUM SERPL-MCNC: 9.7 MG/DL
CHLORIDE SERPL-SCNC: 104 MMOL/L
CO2 SERPL-SCNC: 21 MMOL/L
CREAT SERPL-MCNC: 1.15 MG/DL
CREAT SPEC-SCNC: 62 MG/DL
EGFR: 72 ML/MIN/1.73M2
ESTIMATED AVERAGE GLUCOSE: 140 MG/DL
FRUCTOSAMINE SERPL-MCNC: 272 UMOL/L
GLUCOSE SERPL-MCNC: 163 MG/DL
HBA1C MFR BLD HPLC: 6.5 %
MICROALBUMIN 24H UR DL<=1MG/L-MCNC: 1.4 MG/DL
MICROALBUMIN/CREAT 24H UR-RTO: 23 MG/G
POTASSIUM SERPL-SCNC: 4.5 MMOL/L
SODIUM SERPL-SCNC: 138 MMOL/L

## 2022-12-20 ENCOUNTER — TRANSCRIPTION ENCOUNTER (OUTPATIENT)
Age: 62
End: 2022-12-20

## 2022-12-20 PROBLEM — Z86.39 HISTORY OF TYPE 2 DIABETES MELLITUS: Status: RESOLVED | Noted: 2018-11-05 | Resolved: 2022-08-22

## 2022-12-20 NOTE — ASSESSMENT
[FreeTextEntry1] : Reviewed drug-drug interaction with patient\par He was advised to hold simvastatin while taking Paxlovid.\par All his other medications were reviewed; there was no cross reaction with Paxlovid.\par \par Advised patient to go to emergency room if worsening shortness of breath or high fevers.

## 2022-12-20 NOTE — REVIEW OF SYSTEMS
[Postnasal Drip] : postnasal drip [Sore Throat] : sore throat [Hoarseness] : hoarseness [Cough] : cough [Headache] : headache [Shortness Of Breath] : no shortness of breath [Abdominal Pain] : no abdominal pain [Nausea] : no nausea [Diarrhea] : no diarrhea

## 2022-12-20 NOTE — HISTORY OF PRESENT ILLNESS
[Home] : at home, [unfilled] , at the time of the visit. [Medical Office: ([facility])___] : at the medical office located in  [Verbal consent obtained from patient] : the patient, [unfilled] [FreeTextEntry8] : Spoke to patient\par He agreed for telephone visit\par He had a sore throat x 2 days.  Yesterday tested negative for COVID\par He tested positive today.\par \par He feels, hoarse, as a postnasal drip, nasal congestion, as well as a headache.\par No nausea, vomiting,

## 2023-01-25 ENCOUNTER — RX RENEWAL (OUTPATIENT)
Age: 63
End: 2023-01-25

## 2023-02-06 ENCOUNTER — APPOINTMENT (OUTPATIENT)
Dept: INTERNAL MEDICINE | Facility: CLINIC | Age: 63
End: 2023-02-06
Payer: COMMERCIAL

## 2023-02-06 VITALS
HEIGHT: 68 IN | WEIGHT: 172 LBS | BODY MASS INDEX: 26.07 KG/M2 | DIASTOLIC BLOOD PRESSURE: 80 MMHG | SYSTOLIC BLOOD PRESSURE: 122 MMHG | HEART RATE: 71 BPM | RESPIRATION RATE: 16 BRPM | TEMPERATURE: 98.1 F | OXYGEN SATURATION: 99 %

## 2023-02-06 DIAGNOSIS — J01.90 ACUTE SINUSITIS, UNSPECIFIED: ICD-10-CM

## 2023-02-06 DIAGNOSIS — R09.82 POSTNASAL DRIP: ICD-10-CM

## 2023-02-06 PROCEDURE — 99214 OFFICE O/P EST MOD 30 MIN: CPT

## 2023-02-06 RX ORDER — AMOXICILLIN AND CLAVULANATE POTASSIUM 500; 125 MG/1; MG/1
500-125 TABLET, FILM COATED ORAL EVERY 8 HOURS
Qty: 21 | Refills: 0 | Status: ACTIVE | COMMUNITY
Start: 2023-02-06 | End: 1900-01-01

## 2023-02-06 RX ORDER — FLUTICASONE PROPIONATE 50 UG/1
50 SPRAY, METERED NASAL DAILY
Qty: 1 | Refills: 1 | Status: ACTIVE | COMMUNITY
Start: 2023-02-06 | End: 1900-01-01

## 2023-02-10 NOTE — REVIEW OF SYSTEMS
[Postnasal Drip] : postnasal drip [Fever] : no fever [Chills] : no chills [Sore Throat] : no sore throat [Chest Pain] : no chest pain [Shortness Of Breath] : no shortness of breath [Cough] : no cough [Abdominal Pain] : no abdominal pain [Joint Pain] : no joint pain [FreeTextEntry4] : Yellow nasal discharge

## 2023-02-10 NOTE — PHYSICAL EXAM
[No Acute Distress] : no acute distress [Normal Oropharynx] : the oropharynx was normal [Normal TMs] : both tympanic membranes were normal [No JVD] : no jugular venous distention [No Lymphadenopathy] : no lymphadenopathy [No Respiratory Distress] : no respiratory distress  [Clear to Auscultation] : lungs were clear to auscultation bilaterally [Normal Rate] : normal rate  [Regular Rhythm] : with a regular rhythm [Normal S1, S2] : normal S1 and S2 [No Murmur] : no murmur heard [de-identified] : Mild Nasal mucosal edema; mild maxillary sinus tenderness

## 2023-02-10 NOTE — HISTORY OF PRESENT ILLNESS
[FreeTextEntry8] : Patient comes in c/o nasal congestion and sinus pressure for the past week.  \par He also has yellow nasal mucous\par Positive postnasal drip\par No fever or chills

## 2023-02-13 ENCOUNTER — APPOINTMENT (OUTPATIENT)
Dept: ENDOCRINOLOGY | Facility: CLINIC | Age: 63
End: 2023-02-13

## 2023-03-06 ENCOUNTER — RX RENEWAL (OUTPATIENT)
Age: 63
End: 2023-03-06

## 2023-03-08 LAB
INFLUENZA A RESULT: NOT DETECTED
INFLUENZA B RESULT: NOT DETECTED
RESP SYN VIRUS RESULT: NOT DETECTED
SARS-COV-2 RESULT: NOT DETECTED

## 2023-05-17 ENCOUNTER — APPOINTMENT (OUTPATIENT)
Dept: ENDOCRINOLOGY | Facility: CLINIC | Age: 63
End: 2023-05-17
Payer: COMMERCIAL

## 2023-05-17 VITALS
DIASTOLIC BLOOD PRESSURE: 80 MMHG | SYSTOLIC BLOOD PRESSURE: 116 MMHG | OXYGEN SATURATION: 96 % | HEART RATE: 85 BPM | BODY MASS INDEX: 26.52 KG/M2 | HEIGHT: 68 IN | WEIGHT: 175 LBS

## 2023-05-17 DIAGNOSIS — R79.89 OTHER SPECIFIED ABNORMAL FINDINGS OF BLOOD CHEMISTRY: ICD-10-CM

## 2023-05-17 DIAGNOSIS — D64.9 ANEMIA, UNSPECIFIED: ICD-10-CM

## 2023-05-17 PROCEDURE — 36415 COLL VENOUS BLD VENIPUNCTURE: CPT

## 2023-05-17 PROCEDURE — 99214 OFFICE O/P EST MOD 30 MIN: CPT | Mod: 25

## 2023-05-21 LAB
ALBUMIN SERPL ELPH-MCNC: 4.4 G/DL
ALP BLD-CCNC: 59 U/L
ALT SERPL-CCNC: 20 U/L
ANION GAP SERPL CALC-SCNC: 16 MMOL/L
AST SERPL-CCNC: 16 U/L
BASOPHILS # BLD AUTO: 0.08 K/UL
BASOPHILS NFR BLD AUTO: 1.3 %
BILIRUB DIRECT SERPL-MCNC: 0.1 MG/DL
BILIRUB INDIRECT SERPL-MCNC: 0.1 MG/DL
BILIRUB SERPL-MCNC: <0.2 MG/DL
BUN SERPL-MCNC: 26 MG/DL
CALCIUM SERPL-MCNC: 10.1 MG/DL
CHLORIDE SERPL-SCNC: 102 MMOL/L
CHOLEST SERPL-MCNC: 161 MG/DL
CO2 SERPL-SCNC: 19 MMOL/L
CREAT SERPL-MCNC: 1.02 MG/DL
CREAT SPEC-SCNC: 66 MG/DL
EGFR: 84 ML/MIN/1.73M2
EOSINOPHIL # BLD AUTO: 0.13 K/UL
EOSINOPHIL NFR BLD AUTO: 2.2 %
ESTIMATED AVERAGE GLUCOSE: 206 MG/DL
GLUCOSE SERPL-MCNC: 282 MG/DL
HBA1C MFR BLD HPLC: 8.8 %
HCT VFR BLD CALC: 44.7 %
HDLC SERPL-MCNC: 42 MG/DL
HGB BLD-MCNC: 14.2 G/DL
IMM GRANULOCYTES NFR BLD AUTO: 0.5 %
LDLC SERPL CALC-MCNC: 77 MG/DL
LYMPHOCYTES # BLD AUTO: 1.28 K/UL
LYMPHOCYTES NFR BLD AUTO: 21.3 %
MAN DIFF?: NORMAL
MCHC RBC-ENTMCNC: 29.1 PG
MCHC RBC-ENTMCNC: 31.8 GM/DL
MCV RBC AUTO: 91.6 FL
MICROALBUMIN 24H UR DL<=1MG/L-MCNC: 2.2 MG/DL
MICROALBUMIN/CREAT 24H UR-RTO: 34 MG/G
MONOCYTES # BLD AUTO: 0.52 K/UL
MONOCYTES NFR BLD AUTO: 8.7 %
NEUTROPHILS # BLD AUTO: 3.97 K/UL
NEUTROPHILS NFR BLD AUTO: 66 %
NONHDLC SERPL-MCNC: 119 MG/DL
PLATELET # BLD AUTO: 193 K/UL
POTASSIUM SERPL-SCNC: 5 MMOL/L
PROT SERPL-MCNC: 6.8 G/DL
RBC # BLD: 4.88 M/UL
RBC # FLD: 13.1 %
SODIUM SERPL-SCNC: 137 MMOL/L
TRIGL SERPL-MCNC: 212 MG/DL
TSH SERPL-ACNC: 1.53 UIU/ML
WBC # FLD AUTO: 6.01 K/UL

## 2023-05-22 ENCOUNTER — RX RENEWAL (OUTPATIENT)
Age: 63
End: 2023-05-22

## 2023-05-22 NOTE — HISTORY OF PRESENT ILLNESS
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 may go up to 240.\par            Lowest BS 67\par            Had labs Februrya. \par            Eyes checked with Dr. Almendarez\par            Will see Dr. Guy in May\par            .\par            Plan: same Tx\par            .\par            =\par            .\par            December 8, 2015\par            .\par            PCP: Dr. Tera Guy\par             Eyes: Dr. Nicole \par            . \par            .\par            CC: DM2 (pre DM 2001, DM2 2005)\par            .\par            Blood test results kindly provided by Dr. Guy include:\par            1/2015 8.5 %\par            4/2015 6.8 %\par            11/2015 6.9 %\par            .\par            Diabetes medications:\par            .\par            metformin 500 mg BID\par            Januvia 100 mg daily \par            Glipizide ER 5 mg in PM\par            generic Starlix 60 mg AC dinner. (nateglinide)\par            via [facility]. \par            .\par            Tests with Original Contour Meter. \par            .\par            Impression: Diabetes well controlled without evidence of hypoglycemia.\par            No retinopathy, neuropathy, nephropathy.\par            .\par            Plan: Because of the metformin, next time he has blood tests, will ask to include vitamin B12. Next time he has U/A, will ask him to also have urine for\par            microalbumin/creatinine ratio. \par            .\par            Annual eye exam. Thank you. \par            .\par            ==\par            .\par            April 11, 2015\par            .\par            PCP: Dr. Tera Guy\par             Eyes: Dr. Nicole (mild retinopathy)\par            . 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 Guy include A1c 6.8% (down from 8.5 % in January). \par            .\par            Diabetes medications include:\par            .\par            metformin 500 mg BID\par            Januvia 100 mg daily \par            Glipizide ER 5 mg in PM\par            generic Starlix 60 mg AC dinner. (nateglinide)\par            .\par            Enalapril 10 mg\par            Simvastatin 20 mg\par            Cinnamon and chromium\par            .\par            Reports FBS today 55 mg/dl.\par            .\par            Impression: All things considered, he is doing very well.\par            .\par            Plan: ROV 4-5 months. \par            Annual eye exam.\par            .\par            ==\par            .\par            March 9, 2015\par            .\par            PCP: Dr. Tera Guy\par             Eyes: Dr. Nicole (mild retinopathy)\par            . \par            .\par            CC: DM2 (pre DM 2001, DM2 2005)\par            .\par            55 yo\par            Self tests with original Contour meter.\par            Started on Januvia 100 mg in AM\par            He feels the Januvia plus exercise (shoveling snow, but will swim and bike soon) has also helped. \par            .\par            .\par            14 d avgerage 95 n 12 \par            highest 235 \par            .\par            ==\par            Jan 13, 2015\par            PCP: Dr. Tera Guy\par             Eyes: Dr. Nicole\par            . GI: \par            CC: DM2 (pre DM 2001, DM2 2005)\par            .\par            Lab studies from Jan 5, 2015, kindly provided by Dr. Guy show\par            \par            HbA1c 8.5% \par            normal spot urine microalbumin.\par            .\par            Meds.\par            metformin 500 mg BID\par            Glipizide ER in PM\par            generic Starlix 60 mg AC dinner.\par            .\par            Impression: He attributes elevated BS to holidays.\par            Notes when constipated, BS higher. \par            .\par            Has two children in college.\par            Commutes to  every day.\par            His impression is that he has not been able to pay as much attention to diet and exercise and stressed. \par            Plan; Same Rx, more testing, add Januvia 100 mg daily.\par            [facility]. \par            ROV one month. \par            .\par            .\par            ====\par            July 1, 2014\par            PCP: Dr. Tera Guy\par            CC: DM2 (pre DM 2001) (DM 2005)\par            .\par            Weight now down to 168 lbs. \par            Gave up soda.\par            .\par            Glipizide ER now down to 5 mg in PM\par            (got low on even 5 mg after a heavy rowing)\par            metofrmin  BID\par            rare generic Starlix prn a large meal (60 mg)\par            .\par            Had blood tests done by Dr. Guy in April.\par            BS at 8:00 am was 226 mg/dl ?fasting\par            C-peptide 7.8 (0.9-4.0) (indicating some insulin resistance)\par            Fructosamine 355 (205-285) \par            HbA1c 7.8%\par            LH 7.9 \par            prolactin 7.7\par            Testosterone 207 (241-827) \par            .\par            He is testing with original Contour meter. \par            14 d average 105 n = 7 \par            Highes  mg/dl.\par            .\par            Impression: By his fingerstick BS, seems to be doing well.\par            A1c suggests there may be room for further improvement.\par            .\par            Plan: Continue metformin  mg BID\par            glipizide ER 5 mg in PM\par            prn generic Starlix 60 mg large meal.\par            Do at least one meal before and after sugars a week.\par            .\par            ROV October after visit to Dr. Guy and updated urine microalbumin,\par            complete U/A with microscopic, HbA1c.\par            I will try to get him a One Touch meter. \par            .\par            =====\par            April 1, 2014\par            PCP: Dr. Guy\par            CC: DM2\par            .\par            Has managed to lose some weight (5 ft 8 in , 174 lbs)\par            glipizide ER 10 mg in PM\par            metformin  mg BID\par            nateglanide prn (virtually never)\par            .\par            November - Dr. Nicole -checked for retinopathy - good report.\par            Impression: Time for updated A1c and fructosamine. \par            Plan: He prefers to have blood tests when he sees Dr. Guy in near future. I will ask him to have:\par            A1c\par            fructosamin\par            C-peptide\par            BMP\par            LFTs\par            spot urine for microalbumin and routine U/A\par            LH, FSH\par            prolactin, testosterone\par            .\par            ROV within 3 months.\par            .\par            =====\par            Oct 1, 2013\par            PCP: Dr. Guy Eyes: Dr. Nicole\par            CC: DM2 (pre-DM: 2001; DM2 Dx 2005)\par            ;\par            Now on Januvia 100 mg - stopped. \par            glipizide ER 10 mg in PM\par            metformin  mg BID\par            nateglanide prn\par            .\par            Says FBS are excellent, down to 72 - NR today.\par            Says PC sugars under 180 mg/dl.\par            .\par            Likes large salad his wife makes.\par            .\par            Needs colonoscopy.\par            .\par            .\par            .\par            =======\par            Previous note below. Brought meter. No PC sugars. Will start Januvia 100 mg daily. Get labs from PCP. ROV 3 months. He saw Dr. HOWE for eye check and was given good report. Meter shows 14 d N = 14, avg 157 mg/dl.\par            .\par            .\par            Patient first told of "pre-diabetes" in 2001. He was started on medication for his sugar in 2005. His current diabetes medications include:\par            .\par            glipizde ER 10 mg every PM and\par            metformin  mg BID \par            At last visit, also given a trial of Onglyza 2.5 mg daily (but he did not take)\par            .\par            In April HbA1c was 157 mg/dl at [place]. \par            .\par            Eyes are being checked yearly by Dr. Nicole and he saw her recently and was given a good report. In October, had physical at PCP.\par            .\par            Has been under stress. \par            Brought in his Contour Meter (original) that shows 14d avg 194 mg/dl\par            .\par

## 2023-09-13 ENCOUNTER — APPOINTMENT (OUTPATIENT)
Dept: ENDOCRINOLOGY | Facility: CLINIC | Age: 63
End: 2023-09-13
Payer: COMMERCIAL

## 2023-09-13 PROCEDURE — 99214 OFFICE O/P EST MOD 30 MIN: CPT | Mod: 95

## 2023-12-12 ENCOUNTER — APPOINTMENT (OUTPATIENT)
Dept: INTERNAL MEDICINE | Facility: CLINIC | Age: 63
End: 2023-12-12

## 2023-12-31 PROBLEM — Z23 NEED FOR 23-POLYVALENT PNEUMOCOCCAL POLYSACCHARIDE VACCINE: Status: ACTIVE | Noted: 2020-10-17

## 2024-03-01 ENCOUNTER — APPOINTMENT (OUTPATIENT)
Dept: ENDOCRINOLOGY | Facility: CLINIC | Age: 64
End: 2024-03-01
Payer: COMMERCIAL

## 2024-03-01 VITALS
DIASTOLIC BLOOD PRESSURE: 70 MMHG | SYSTOLIC BLOOD PRESSURE: 112 MMHG | WEIGHT: 167 LBS | BODY MASS INDEX: 25.31 KG/M2 | OXYGEN SATURATION: 98 % | HEART RATE: 72 BPM | HEIGHT: 68 IN

## 2024-03-01 DIAGNOSIS — E10.9 TYPE 1 DIABETES MELLITUS W/OUT COMPLICATIONS: ICD-10-CM

## 2024-03-01 LAB
ANION GAP SERPL CALC-SCNC: 13 MMOL/L
BUN SERPL-MCNC: 20 MG/DL
CALCIUM SERPL-MCNC: 9.9 MG/DL
CHLORIDE SERPL-SCNC: 99 MMOL/L
CO2 SERPL-SCNC: 25 MMOL/L
CREAT SERPL-MCNC: 1.12 MG/DL
EGFR: 74 ML/MIN/1.73M2
GLUCOSE SERPL-MCNC: 198 MG/DL
POTASSIUM SERPL-SCNC: 4.5 MMOL/L
SODIUM SERPL-SCNC: 136 MMOL/L

## 2024-03-01 PROCEDURE — G2211 COMPLEX E/M VISIT ADD ON: CPT

## 2024-03-01 PROCEDURE — 36415 COLL VENOUS BLD VENIPUNCTURE: CPT

## 2024-03-01 PROCEDURE — 99215 OFFICE O/P EST HI 40 MIN: CPT

## 2024-03-01 RX ORDER — GLIPIZIDE 5 MG/1
5 TABLET, FILM COATED, EXTENDED RELEASE ORAL DAILY
Qty: 180 | Refills: 3 | Status: ACTIVE | COMMUNITY
Start: 1900-01-01 | End: 1900-01-01

## 2024-03-01 NOTE — HISTORY OF PRESENT ILLNESS
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            .            PCP: Dr. Tera Guy             Eyes: Dr. Nicole             .             .            CC: DM2 (pre DM 2001, DM2 2005)            .            Blood test results kindly provided by Dr. Guy include:            1/2015 8.5 %            4/2015 6.8 %            11/2015 6.9 %            .            Diabetes medications:            .            metformin 500 mg BID            Januvia 100 mg daily             Glipizide ER 5 mg in PM            generic Starlix 60 mg AC dinner. (nateglinide)            via [facility].             .            Tests with Original Contour Meter.             .            Impression: Diabetes well controlled without evidence of hypoglycemia.            No retinopathy, neuropathy, nephropathy.            .            Plan: Because of the metformin, next time he has blood tests, will ask to include vitamin B12. Next time he has U/A, will ask him to also have urine for            microalbumin/creatinine ratio.             .            Annual eye exam. Thank you.             .            ==            .            April 11, 2015            .            PCP: Dr. Tera Guy             Eyes: Dr. Nicole (mild retinopathy)            .             .            CC: DM2 (pre DM 2001, DM2 2005)            .            Note from March appended below.            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            .            His wife was involved in bad MVA on [employer].            And his dog passed away after vestibular disease.            .            He brings in his original Contour meter.            Labs kindly provided by Dr. Guy include A1c 6.8% (down from 8.5 % in January).             .            Diabetes medications include:            .            metformin 500 mg BID            Januvia 100 mg daily             Glipizide ER 5 mg in PM            generic Starlix 60 mg AC dinner. (nateglinide)            .            Enalapril 10 mg            Simvastatin 20 mg            Cinnamon and chromium            .            Reports FBS today 55 mg/dl.            .            Impression: All things considered, he is doing very well.            .            Plan: ROV 4-5 months.             Annual eye exam.            .            ==            .            March 9, 2015            .            PCP: Dr. Tera Guy             Eyes: Dr. Nicole (mild retinopathy)            .             .            CC: DM2 (pre DM 2001, DM2 2005)            .            55 yo            Self tests with original Contour meter.            Started on Januvia 100 mg in AM            He feels the Januvia plus exercise (shoveling snow, but will swim and bike soon) has also helped.             .            .            14 d avgerage 95 n 12             highest 235             .            ==            Jan 13, 2015            PCP: Dr. Tera Guy             Eyes: Dr. Nicole            . GI:             CC: DM2 (pre DM 2001, DM2 2005)            .            Lab studies from Jan 5, 2015, kindly provided by Dr. Guy show                        HbA1c 8.5%             normal spot urine microalbumin.            .            Meds.            metformin 500 mg BID            Glipizide ER in PM            generic Starlix 60 mg AC dinner.            .            Impression: He attributes elevated BS to holidays.            Notes when constipated, BS higher.             .            Has two children in college.            Commutes to  every day.            His impression is that he has not been able to pay as much attention to diet and exercise and stressed.             Plan; Same Rx, more testing, add Januvia 100 mg daily.            [facility].             ROV one month.             .            .            ====            July 1, 2014            PCP: Dr. Tera Guy            CC: DM2 (pre DM 2001) (DM 2005)            .            Weight now down to 168 lbs.             Gave up soda.            .            Glipizide ER now down to 5 mg in PM            (got low on even 5 mg after a heavy rowing)            metofrmin  BID            rare generic Starlix prn a large meal (60 mg)            .            Had blood tests done by Dr. Guy in April.            BS at 8:00 am was 226 mg/dl ?fasting            C-peptide 7.8 (0.9-4.0) (indicating some insulin resistance)            Fructosamine 355 (205-285)             HbA1c 7.8%            LH 7.9             prolactin 7.7            Testosterone 207 (241-827)             .            He is testing with original Contour meter.             14 d average 105 n = 7             Highes  mg/dl.            .            Impression: By his fingerstick BS, seems to be doing well.            A1c suggests there may be room for further improvement.            .            Plan: Continue metformin  mg BID            glipizide ER 5 mg in PM            prn generic Starlix 60 mg large meal.            Do at least one meal before and after sugars a week.            .            ROV October after visit to Dr. Guy and updated urine microalbumin,            complete U/A with microscopic, HbA1c.            I will try to get him a One Touch meter.             .            =====            April 1, 2014            PCP: Dr. Guy            CC: DM2            .            Has managed to lose some weight (5 ft 8 in , 174 lbs)            glipizide ER 10 mg in PM            metformin  mg BID            nateglanide prn (virtually never)            .            November - Dr. Nicole -checked for retinopathy - good report.            Impression: Time for updated A1c and fructosamine.             Plan: He prefers to have blood tests when he sees Dr. Guy in near future. I will ask him to have:            A1c            fructosamin            C-peptide            BMP            LFTs            spot urine for microalbumin and routine U/A            LH, FSH            prolactin, testosterone            .            ROV within 3 months.            .            =====            Oct 1, 2013            PCP: Dr. Gyu Eyes: Dr. Nicole            CC: DM2 (pre-DM: 2001; DM2 Dx 2005)            ;            Now on Januvia 100 mg - stopped.             glipizide ER 10 mg in PM            metformin  mg BID            nateglanide prn            .            Says FBS are excellent, down to 72 - NR today.            Says PC sugars under 180 mg/dl.            .            Likes large salad his wife makes.            .            Needs colonoscopy.            .            .            .            =======            Previous note below. Brought meter. No PC sugars. Will start Januvia 100 mg daily. Get labs from PCP. ROV 3 months. He saw Dr. HOWE for eye check and was given good report. Meter shows 14 d N = 14, avg 157 mg/dl.            .            .            Patient first told of "pre-diabetes" in 2001. He was started on medication for his sugar in 2005. His current diabetes medications include:            .            glipizde ER 10 mg every PM and            metformin  mg BID             At last visit, also given a trial of Onglyza 2.5 mg daily (but he did not take)            .            In April HbA1c was 157 mg/dl at [place].             .            Eyes are being checked yearly by Dr. Nicole and he saw her recently and was given a good report. In October, had physical at PCP.            .            Has been under stress.             Brought in his Contour Meter (original) that shows 14d avg 194 mg/dl            .

## 2024-03-02 LAB
ESTIMATED AVERAGE GLUCOSE: 232 MG/DL
HBA1C MFR BLD HPLC: 9.7 %

## 2024-03-06 RX ORDER — SIMVASTATIN 20 MG/1
20 TABLET, FILM COATED ORAL
Qty: 90 | Refills: 3 | Status: ACTIVE | COMMUNITY
Start: 2022-01-25 | End: 1900-01-01

## 2024-03-06 RX ORDER — NATEGLINIDE 120 MG/1
120 TABLET, COATED ORAL
Qty: 30 | Refills: 3 | Status: ACTIVE | COMMUNITY
Start: 1900-01-01 | End: 1900-01-01

## 2024-03-06 RX ORDER — SITAGLIPTIN 100 MG/1
100 TABLET, FILM COATED ORAL
Qty: 30 | Refills: 11 | Status: ACTIVE | COMMUNITY
Start: 2019-04-30 | End: 1900-01-01

## 2024-03-06 RX ORDER — ENALAPRIL MALEATE 10 MG/1
10 TABLET ORAL
Qty: 30 | Refills: 2 | Status: ACTIVE | COMMUNITY
Start: 2019-02-07 | End: 1900-01-01

## 2024-03-06 RX ORDER — SIMVASTATIN 20 MG/1
20 TABLET, FILM COATED ORAL
Qty: 30 | Refills: 1 | Status: ACTIVE | COMMUNITY
Start: 2019-02-07 | End: 1900-01-01

## 2024-03-12 ENCOUNTER — APPOINTMENT (OUTPATIENT)
Dept: INTERNAL MEDICINE | Facility: CLINIC | Age: 64
End: 2024-03-12

## 2024-06-13 RX ORDER — METFORMIN HYDROCHLORIDE 500 MG/1
500 TABLET, COATED ORAL
Qty: 120 | Refills: 8 | Status: ACTIVE | COMMUNITY
Start: 2019-06-16 | End: 1900-01-01

## 2024-07-26 ENCOUNTER — NON-APPOINTMENT (OUTPATIENT)
Age: 64
End: 2024-07-26

## 2024-08-23 ENCOUNTER — RX RENEWAL (OUTPATIENT)
Age: 64
End: 2024-08-23

## 2024-09-04 ENCOUNTER — APPOINTMENT (OUTPATIENT)
Dept: ENDOCRINOLOGY | Facility: CLINIC | Age: 64
End: 2024-09-04
Payer: COMMERCIAL

## 2024-09-04 VITALS
WEIGHT: 176 LBS | OXYGEN SATURATION: 99 % | SYSTOLIC BLOOD PRESSURE: 122 MMHG | HEIGHT: 68 IN | BODY MASS INDEX: 26.67 KG/M2 | DIASTOLIC BLOOD PRESSURE: 76 MMHG | HEART RATE: 70 BPM

## 2024-09-04 DIAGNOSIS — E10.9 TYPE 1 DIABETES MELLITUS W/OUT COMPLICATIONS: ICD-10-CM

## 2024-09-04 PROCEDURE — 99215 OFFICE O/P EST HI 40 MIN: CPT

## 2024-09-05 RX ORDER — BLOOD-GLUCOSE SENSOR
EACH MISCELLANEOUS
Qty: 2 | Refills: 6 | Status: ACTIVE | COMMUNITY
Start: 2024-09-05 | End: 1900-01-01

## 2024-09-05 NOTE — HISTORY OF PRESENT ILLNESS
[FreeTextEntry1] : Sep 04, 2024      in person  PCP: Dr. Keri Hughes  [facility]  ph  [phone]             Ophthalmology: Dr. Taylor            .            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  FBS today 266 mg/dl after pasta for dinner last night. He has tried out the Brian and now that he is taking insulin  : : Constitutional:  Alert, well nourished, healthy appearance, no acute distress  Eyes:  No proptosis, no stare Thyroid: Pulmonary:  No respiratory distress, no accessory muscle use; normal rate and effort Cardiac:  Normal rate Vascular:  Endocrine:  No stigmata of Cushings Syndrome Musculoskeletal:  Normal gait, no involuntary movements Neurology:  No tremors Affect/Mood/Psych:  Oriented x 3; normal affect, normal insight/judgement, normal mood  .   He was offered Lantus by Dr. Hughes, and Novolog TID AC.   He is interested to utilize benefits of CGM Brian 3      Mar 01, 2024     in person        PCP: Dr. keri Hughes  [facility]  ph  [phone]             Ophthalmology: Dr. Taylor            .            CC: Type 2 Diabetes (prediabetes 2001, Type 2 - 2005)                          4/30/19  A1c 10.1;  1/19/20  9.1;  8/13/20:  6.3 %; 3/201: 7.6 %       7/27/22 A1c  8.9% **   12/2022 6.5%    9/2023 7.9%                    (Post Covid 19 infection fatigue)  64 yo with Type 2 diabetes - on 7/27/22 A1c 8.9%    9/2024  A1c 7.9        His wfie had lumpectomy, RT x 10 for breast cancer at [facility] and supposed to go on an estrogen blocker.  FBS today 266 mg/dl after pasta for dinner last night. He has tried out the Brian and would be eligible for the Dexcom  were he on insulin - but not interested in either at present.  : : Constitutional:  Alert, well nourished, healthy appearance, no acute distress  Eyes:  No proptosis, no stare Thyroid: Pulmonary:  No respiratory distress, no accessory muscle use; normal rate and effort Cardiac:  Normal rate Vascular:  Endocrine:  No stigmata of Cushings Syndrome Musculoskeletal:  Normal gait, no involuntary movements Neurology:  No tremors Affect/Mood/Psych:  Oriented x 3; normal affect, normal insight/judgement, normal mood  .  Impression:   Doing well. He would benefit from insulin  Plan:  Same Rx. Annual eye exam     Sep 13, 2023      vid chat                 his wife being [place] [facility] will delay trip to [facility]  PCP: Dr. Noa Valentine -              Ophthalmology: Dr. Taylor            .            CC: Type 2 Diabetes (prediabetes 2001, Type 2 - 2005)                          4/30/19  A1c 10.1;  1/19/20  9.1;  8/13/20:  6.3 %; 3/201: 7.6 %       7/27/22 A1c  8.9% **   12/2022 6.5%                    (Post Covid 19 infection fatigue)  64 yo with Type 2 diabetes - on 7/27/22 A1c 8.9% FBS today 266 mg/dl after pasta for dinner last night. He has tried out the Brian and would be eligible for the Dexcom  were he on insulin - but not interested in either at present. Saw ophthalmology, Dr. Taylor, who said eye issues were stable. Has been able to work from home.       Son in [place].    Imp:  FBS today 190 mg/dl   Medications remain: glipizide XL, 5 mg x 2 daily, at dinner   -    mail order [facility]  Januvia 100 mg daily  metformin 500 mg, two BID  natiglinide 120 mg BID AC -  also on enalparil and simvastatin    Imp:  doing as well as he can Plan:  Meds renewed. [facility] form mailed.  May 17, 2023  in person  PCP: Dr. Noa Valentine -              Ophthalmology: Dr. Taylor            .            CC: Type 2 Diabetes (prediabetes 2001, Type 2 - 2005)                          4/30/19  A1c 10.1;  1/19/20  9.1;  8/13/20:  6.3 %; 3/201: 7.6 %       7/27/22 A1c  8.9% **   12/2022 6.5%                    (Post Covid 19 infection fatigue)  61 yo with Type 2 diabetes - on 7/27/22 A1c 8.9% FBS today 266 mg/dl after pasta for dinner last night. He has tried out the Brian and would be eligible for the Dexcom  were he on insulin - but not interested in either at present. Saw ophthalmology, Dr. Taylor, who said eye issues were stable. Has been able to work from home.       Son in [facility].    : : Constitutional:  Alert, well nourished, healthy appearance, no acute distress  Eyes:  No proptosis, no stare Thyroid: Pulmonary:  No respiratory distress, no accessory muscle use; normal rate and effort Cardiac:  Normal rate Vascular:  Endocrine:  No stigmata of Cushing's Syndrome Musculoskeletal:  Normal gait, no involuntary movements Neurology:  No tremors Affect/Mood/Psych:  Oriented x 3; normal affect, normal insight/judgement, normal mood  . Remains on:    glipizide XL, 5 mg x 2 daily, at dinner   -    mail order [email or web address]  Januvia 100 mg daily  metformin 500 mg, two BID  natiglinide 120 mg BID AC - BC/BS   also on enalapril and statin  Imp/Plan:  Working hard to keep sugars under control.     Dec 12, 2022   in person      (Covid x 2)  PCP: Dr. Noa Valentine -              Ophthalmology: Dr. Nicole            .            CC: Type 2 Diabetes (prediabetes 2001, Type 2 - 2005)                          4/30/19  A1c 10.1;  1/19/20  9.1;  8/13/20:  6.3 %; 3/201: 7.6 %       7/27/22 A1c  8.9% **                    (Post Covid 19 infection fatigue)  61 yo with Type 2 diabetes - on 7/27/22 A1c 8.9% urine microalbumin ratio WNL  Lowest BS 59 mg/dl - no low recently.  glipizide XL, 5 mg x 2 daily, at dinner   -    mail order [facility]  Januvia 100 mg daily  metformin 500 mg, two BID  natiglinide 120 mg BID AC - BC/BS   Last visit, provided Rx for insulin TID AC, but it was not provided to him. He is monitoring sugars with [employer] True Metrix meter.    : : Constitutional:  Alert, well nourished, healthy appearance, no acute distress  Eyes:  No proptosis, no stare Thyroid: Pulmonary:  No respiratory distress, no accessory muscle use; normal rate and effort Cardiac:  Normal rate Vascular:  Endocrine:  No stigmata of Cushing's Syndrome Musculoskeletal:  Normal gait, no involuntary movements Neurology:  No tremors Affect/Mood/Psych:  Oriented x 3; normal affect, normal insight/judgement, normal mood  .  Imp:  he says he is keeping to diet, exercisisng.    Prefers to avoid insulin. He has been told insurance witll cover Dexom if he is on insulin.  Plan;  A1c today and ROV in May and September.

## 2024-09-05 NOTE — HISTORY OF PRESENT ILLNESS
[FreeTextEntry1] : Sep 04, 2024      in person  PCP: Dr. Keri Hughes  [facility]  ph  [phone]             Ophthalmology: Dr. Taylor            .            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  FBS today 266 mg/dl after pasta for dinner last night. He has tried out the Brian and now that he is taking insulin  : : Constitutional:  Alert, well nourished, healthy appearance, no acute distress  Eyes:  No proptosis, no stare Thyroid: Pulmonary:  No respiratory distress, no accessory muscle use; normal rate and effort Cardiac:  Normal rate Vascular:  Endocrine:  No stigmata of Cushings Syndrome Musculoskeletal:  Normal gait, no involuntary movements Neurology:  No tremors Affect/Mood/Psych:  Oriented x 3; normal affect, normal insight/judgement, normal mood  .   He was offered Lantus by Dr. Hughes, and Novolog TID AC.   He is interested to utilize benefits of CGM Brian 3      Mar 01, 2024     in person        PCP: Dr. keri Hughes  [facility]  ph  [phone]             Ophthalmology: Dr. Taylor            .            CC: Type 2 Diabetes (prediabetes 2001, Type 2 - 2005)                          4/30/19  A1c 10.1;  1/19/20  9.1;  8/13/20:  6.3 %; 3/201: 7.6 %       7/27/22 A1c  8.9% **   12/2022 6.5%    9/2023 7.9%                    (Post Covid 19 infection fatigue)  64 yo with Type 2 diabetes - on 7/27/22 A1c 8.9%    9/2024  A1c 7.9        His wfie had lumpectomy, RT x 10 for breast cancer at [facility] and supposed to go on an estrogen blocker.  FBS today 266 mg/dl after pasta for dinner last night. He has tried out the Brian and would be eligible for the Dexcom  were he on insulin - but not interested in either at present.  : : Constitutional:  Alert, well nourished, healthy appearance, no acute distress  Eyes:  No proptosis, no stare Thyroid: Pulmonary:  No respiratory distress, no accessory muscle use; normal rate and effort Cardiac:  Normal rate Vascular:  Endocrine:  No stigmata of Cushings Syndrome Musculoskeletal:  Normal gait, no involuntary movements Neurology:  No tremors Affect/Mood/Psych:  Oriented x 3; normal affect, normal insight/judgement, normal mood  .  Impression:   Doing well. He would benefit from insulin  Plan:  Same Rx. Annual eye exam     Sep 13, 2023      vid chat                 his wife being followed at [facility] will delay trip to [facility]  PCP: Dr. Noa Valentine -              Ophthalmology: Dr. Taylor            .            CC: Type 2 Diabetes (prediabetes 2001, Type 2 - 2005)                          4/30/19  A1c 10.1;  1/19/20  9.1;  8/13/20:  6.3 %; 3/201: 7.6 %       7/27/22 A1c  8.9% **   12/2022 6.5%                    (Post Covid 19 infection fatigue)  64 yo with Type 2 diabetes - on 7/27/22 A1c 8.9% FBS today 266 mg/dl after pasta for dinner last night. He has tried out the Brian and would be eligible for the Dexcom  were he on insulin - but not interested in either at present. Saw ophthalmology, Dr. Taylor, who said eye issues were stable. Has been able to work from home.       Son in [place].    Imp:  FBS today 190 mg/dl   Medications remain: glipizide XL, 5 mg x 2 daily, at dinner   -    mail order [facility]  Januvia 100 mg daily  metformin 500 mg, two BID  natiglinide 120 mg BID AC -  also on enalparil and simvastatin    Imp:  doing as well as he can Plan:  Meds renewed. [facility] form mailed.  May 17, 2023  in person  PCP: Dr. Noa Valentine -              Ophthalmology: Dr. Taylor            .            CC: Type 2 Diabetes (prediabetes 2001, Type 2 - 2005)                          4/30/19  A1c 10.1;  1/19/20  9.1;  8/13/20:  6.3 %; 3/201: 7.6 %       7/27/22 A1c  8.9% **   12/2022 6.5%                    (Post Covid 19 infection fatigue)  61 yo with Type 2 diabetes - on 7/27/22 A1c 8.9% FBS today 266 mg/dl after pasta for dinner last night. He has tried out the Brian and would be eligible for the Dexcom  were he on insulin - but not interested in either at present. Saw ophthalmology, Dr. Taylor, who said eye issues were stable. Has been able to work from home.       Son in [facility].    : : Constitutional:  Alert, well nourished, healthy appearance, no acute distress  Eyes:  No proptosis, no stare Thyroid: Pulmonary:  No respiratory distress, no accessory muscle use; normal rate and effort Cardiac:  Normal rate Vascular:  Endocrine:  No stigmata of Cushing's Syndrome Musculoskeletal:  Normal gait, no involuntary movements Neurology:  No tremors Affect/Mood/Psych:  Oriented x 3; normal affect, normal insight/judgement, normal mood  . Remains on:    glipizide XL, 5 mg x 2 daily, at dinner   -    mail order [facility]  Januvia 100 mg daily  metformin 500 mg, two BID  natiglinide 120 mg BID AC - BC/BS   also on enalapril and statin  Imp/Plan:  Working hard to keep sugars under control.     Dec 12, 2022   in person      (Covid x 2)  PCP: Dr. Noa Valentine -              Ophthalmology: Dr. Nicole            .            CC: Type 2 Diabetes (prediabetes 2001, Type 2 - 2005)                          4/30/19  A1c 10.1;  1/19/20  9.1;  8/13/20:  6.3 %; 3/201: 7.6 %       7/27/22 A1c  8.9% **                    (Post Covid 19 infection fatigue)  61 yo with Type 2 diabetes - on 7/27/22 A1c 8.9% urine microalbumin ratio WNL  Lowest BS 59 mg/dl - no low recently.  glipizide XL, 5 mg x 2 daily, at dinner   -    mail order [facility]  Januvia 100 mg daily  metformin 500 mg, two BID  natiglinide 120 mg BID AC - BC/BS   Last visit, provided Rx for insulin TID AC, but it was not provided to him. He is monitoring sugars with [employer] True Metrix meter.    : : Constitutional:  Alert, well nourished, healthy appearance, no acute distress  Eyes:  No proptosis, no stare Thyroid: Pulmonary:  No respiratory distress, no accessory muscle use; normal rate and effort Cardiac:  Normal rate Vascular:  Endocrine:  No stigmata of Cushing's Syndrome Musculoskeletal:  Normal gait, no involuntary movements Neurology:  No tremors Affect/Mood/Psych:  Oriented x 3; normal affect, normal insight/judgement, normal mood  .  Imp:  he says he is keeping to diet, exercisisng.    Prefers to avoid insulin. He has been told insurance witll cover Dexom if he is on insulin.  Plan;  A1c today and ROV in May and September.

## 2025-01-02 ENCOUNTER — RX RENEWAL (OUTPATIENT)
Age: 65
End: 2025-01-02

## 2025-01-02 DIAGNOSIS — E11.9 TYPE 2 DIABETES MELLITUS W/OUT COMPLICATIONS: ICD-10-CM

## 2025-02-25 ENCOUNTER — APPOINTMENT (OUTPATIENT)
Dept: ENDOCRINOLOGY | Facility: CLINIC | Age: 65
End: 2025-02-25
Payer: COMMERCIAL

## 2025-02-25 ENCOUNTER — NON-APPOINTMENT (OUTPATIENT)
Age: 65
End: 2025-02-25

## 2025-02-25 VITALS
HEART RATE: 105 BPM | HEIGHT: 68 IN | BODY MASS INDEX: 26.24 KG/M2 | OXYGEN SATURATION: 99 % | WEIGHT: 173.13 LBS | SYSTOLIC BLOOD PRESSURE: 122 MMHG | DIASTOLIC BLOOD PRESSURE: 70 MMHG

## 2025-02-25 DIAGNOSIS — E53.8 DEFICIENCY OF OTHER SPECIFIED B GROUP VITAMINS: ICD-10-CM

## 2025-02-25 DIAGNOSIS — E10.9 TYPE 1 DIABETES MELLITUS W/OUT COMPLICATIONS: ICD-10-CM

## 2025-02-25 PROCEDURE — 99214 OFFICE O/P EST MOD 30 MIN: CPT

## 2025-02-26 LAB
ALBUMIN SERPL ELPH-MCNC: 4.7 G/DL
ALP BLD-CCNC: 72 U/L
ALT SERPL-CCNC: 13 U/L
ANION GAP SERPL CALC-SCNC: 12 MMOL/L
AST SERPL-CCNC: 12 U/L
BASOPHILS # BLD AUTO: 0.08 K/UL
BASOPHILS NFR BLD AUTO: 1.3 %
BILIRUB DIRECT SERPL-MCNC: 0.1 MG/DL
BILIRUB INDIRECT SERPL-MCNC: NORMAL MG/DL
BILIRUB SERPL-MCNC: 0.2 MG/DL
BUN SERPL-MCNC: 23 MG/DL
CALCIUM SERPL-MCNC: 9.7 MG/DL
CHLORIDE SERPL-SCNC: 102 MMOL/L
CHOLEST SERPL-MCNC: 148 MG/DL
CO2 SERPL-SCNC: 22 MMOL/L
CREAT SERPL-MCNC: 1.26 MG/DL
CREAT SPEC-SCNC: 71 MG/DL
EGFR: 64 ML/MIN/1.73M2
EOSINOPHIL # BLD AUTO: 0.14 K/UL
EOSINOPHIL NFR BLD AUTO: 2.3 %
ESTIMATED AVERAGE GLUCOSE: 206 MG/DL
GLUCOSE SERPL-MCNC: 323 MG/DL
HBA1C MFR BLD HPLC: 8.8 %
HCT VFR BLD CALC: 44.4 %
HDLC SERPL-MCNC: 37 MG/DL
HGB BLD-MCNC: 14.5 G/DL
IMM GRANULOCYTES NFR BLD AUTO: 0.5 %
LDLC SERPL CALC-MCNC: 82 MG/DL
LYMPHOCYTES # BLD AUTO: 1.15 K/UL
LYMPHOCYTES NFR BLD AUTO: 18.5 %
MAN DIFF?: NORMAL
MCHC RBC-ENTMCNC: 28.8 PG
MCHC RBC-ENTMCNC: 32.7 G/DL
MCV RBC AUTO: 88.1 FL
MICROALBUMIN 24H UR DL<=1MG/L-MCNC: 1.5 MG/DL
MICROALBUMIN/CREAT 24H UR-RTO: 21 MG/G
MONOCYTES # BLD AUTO: 0.54 K/UL
MONOCYTES NFR BLD AUTO: 8.7 %
NEUTROPHILS # BLD AUTO: 4.26 K/UL
NEUTROPHILS NFR BLD AUTO: 68.7 %
NONHDLC SERPL-MCNC: 111 MG/DL
PLATELET # BLD AUTO: 176 K/UL
POTASSIUM SERPL-SCNC: 5.4 MMOL/L
PROT SERPL-MCNC: 6.7 G/DL
RBC # BLD: 5.04 M/UL
RBC # FLD: 13.1 %
SODIUM SERPL-SCNC: 136 MMOL/L
TRIGL SERPL-MCNC: 171 MG/DL
TSH SERPL-ACNC: 2.13 UIU/ML
VIT B12 SERPL-MCNC: >2000 PG/ML
WBC # FLD AUTO: 6.2 K/UL

## 2025-03-11 ENCOUNTER — RX RENEWAL (OUTPATIENT)
Age: 65
End: 2025-03-11

## 2025-08-18 ENCOUNTER — APPOINTMENT (OUTPATIENT)
Dept: ENDOCRINOLOGY | Facility: CLINIC | Age: 65
End: 2025-08-18
Payer: COMMERCIAL

## 2025-08-18 VITALS
HEART RATE: 88 BPM | BODY MASS INDEX: 26.52 KG/M2 | HEIGHT: 68 IN | OXYGEN SATURATION: 96 % | WEIGHT: 175 LBS | SYSTOLIC BLOOD PRESSURE: 116 MMHG | DIASTOLIC BLOOD PRESSURE: 82 MMHG

## 2025-08-18 DIAGNOSIS — E11.9 TYPE 2 DIABETES MELLITUS W/OUT COMPLICATIONS: ICD-10-CM

## 2025-08-18 DIAGNOSIS — E10.9 TYPE 1 DIABETES MELLITUS W/OUT COMPLICATIONS: ICD-10-CM

## 2025-08-18 PROCEDURE — 95251 CONT GLUC MNTR ANALYSIS I&R: CPT

## 2025-08-18 PROCEDURE — 99214 OFFICE O/P EST MOD 30 MIN: CPT

## 2025-08-19 LAB
ANION GAP SERPL CALC-SCNC: 16 MMOL/L
BUN SERPL-MCNC: 23 MG/DL
CALCIUM SERPL-MCNC: 10.4 MG/DL
CHLORIDE SERPL-SCNC: 102 MMOL/L
CO2 SERPL-SCNC: 18 MMOL/L
CREAT SERPL-MCNC: 1.05 MG/DL
EGFRCR SERPLBLD CKD-EPI 2021: 79 ML/MIN/1.73M2
ESTIMATED AVERAGE GLUCOSE: 203 MG/DL
GLUCOSE SERPL-MCNC: 274 MG/DL
HBA1C MFR BLD HPLC: 8.7 %
POTASSIUM SERPL-SCNC: 4.7 MMOL/L
SODIUM SERPL-SCNC: 136 MMOL/L